# Patient Record
Sex: MALE | Race: WHITE | NOT HISPANIC OR LATINO | Employment: OTHER | URBAN - METROPOLITAN AREA
[De-identification: names, ages, dates, MRNs, and addresses within clinical notes are randomized per-mention and may not be internally consistent; named-entity substitution may affect disease eponyms.]

---

## 2019-09-10 ENCOUNTER — HOSPITAL ENCOUNTER (OUTPATIENT)
Facility: CLINIC | Age: 69
Discharge: HOME | End: 2019-09-10
Attending: COLON & RECTAL SURGERY | Admitting: COLON & RECTAL SURGERY
Payer: COMMERCIAL

## 2019-09-10 VITALS
DIASTOLIC BLOOD PRESSURE: 88 MMHG | SYSTOLIC BLOOD PRESSURE: 120 MMHG | HEART RATE: 62 BPM | RESPIRATION RATE: 39 BRPM | OXYGEN SATURATION: 96 %

## 2019-09-10 LAB — COLONOSCOPY: NORMAL

## 2019-09-10 PROCEDURE — 45385 COLONOSCOPY W/LESION REMOVAL: CPT | Performed by: COLON & RECTAL SURGERY

## 2019-09-10 PROCEDURE — G0500 MOD SEDAT ENDO SERVICE >5YRS: HCPCS | Performed by: COLON & RECTAL SURGERY

## 2019-09-10 PROCEDURE — 88305 TISSUE EXAM BY PATHOLOGIST: CPT | Performed by: COLON & RECTAL SURGERY

## 2019-09-10 PROCEDURE — 25000128 H RX IP 250 OP 636: Performed by: COLON & RECTAL SURGERY

## 2019-09-10 PROCEDURE — 88305 TISSUE EXAM BY PATHOLOGIST: CPT | Mod: 26 | Performed by: COLON & RECTAL SURGERY

## 2019-09-10 RX ORDER — NALOXONE HYDROCHLORIDE 0.4 MG/ML
.1-.4 INJECTION, SOLUTION INTRAMUSCULAR; INTRAVENOUS; SUBCUTANEOUS
Status: CANCELLED | OUTPATIENT
Start: 2019-09-10 | End: 2019-09-11

## 2019-09-10 RX ORDER — LIDOCAINE 40 MG/G
CREAM TOPICAL
Status: DISCONTINUED | OUTPATIENT
Start: 2019-09-10 | End: 2019-09-11 | Stop reason: HOSPADM

## 2019-09-10 RX ORDER — PROCHLORPERAZINE MALEATE 5 MG
5 TABLET ORAL EVERY 6 HOURS PRN
Status: CANCELLED | OUTPATIENT
Start: 2019-09-10

## 2019-09-10 RX ORDER — ONDANSETRON 4 MG/1
4 TABLET, ORALLY DISINTEGRATING ORAL EVERY 6 HOURS PRN
Status: CANCELLED | OUTPATIENT
Start: 2019-09-10

## 2019-09-10 RX ORDER — FLUMAZENIL 0.1 MG/ML
0.2 INJECTION, SOLUTION INTRAVENOUS
Status: CANCELLED | OUTPATIENT
Start: 2019-09-10 | End: 2019-09-10

## 2019-09-10 RX ORDER — ONDANSETRON 2 MG/ML
4 INJECTION INTRAMUSCULAR; INTRAVENOUS EVERY 6 HOURS PRN
Status: CANCELLED | OUTPATIENT
Start: 2019-09-10

## 2019-09-10 RX ORDER — FENTANYL CITRATE 50 UG/ML
INJECTION, SOLUTION INTRAMUSCULAR; INTRAVENOUS PRN
Status: DISCONTINUED | OUTPATIENT
Start: 2019-09-10 | End: 2019-09-11 | Stop reason: HOSPADM

## 2019-09-10 RX ORDER — ONDANSETRON 2 MG/ML
4 INJECTION INTRAMUSCULAR; INTRAVENOUS
Status: DISCONTINUED | OUTPATIENT
Start: 2019-09-10 | End: 2019-09-11 | Stop reason: HOSPADM

## 2019-09-10 NOTE — H&P
Pre-Endoscopy History and Physical   Soto Guevara MRN# [id number]   YOB: 1950 Age: 68 year old   Date of Procedure: 9/10/2019   Primary care provider: Carlos Merida   Type of Endoscopy: colonoscopy   Reason for Procedure: follow up diverticulitis   Type of Anesthesia Anticipated: Moderate Sedation   HPI:   Soto is a 68 year old male with a recent episode of diverticulitis.  He last had a colonoscopy in 2016 where 2 2-mm tubular adenomas were removed.  He denies BRBPR, abdominal pain, nausea/vomiting, changes in bowel habits or unintentional weight loss.  He denies a FH of CRC.    Allergies   Allergen Reactions     Amoxicillin      Biaxin [Clarithromycin]      Lomotil [Diphenoxylate]      Penicillins       Prior to Admission Medications   Prescriptions Last Dose Informant Patient Reported? Taking?   BuPROPion HCl (WELLBUTRIN PO) 9/9/2019 at Unknown time  Yes Yes   LOSARTAN POTASSIUM PO 9/9/2019 at Unknown time  Yes Yes   amLODIPine (NORVASC) 5 MG tablet 9/9/2019 at Unknown time  Yes Yes   Sig: Take 2.5 mg by mouth daily    lisinopril (PRINIVIL,ZESTRIL) 20 MG tablet Unknown at Unknown time  Yes No   Sig: Take 20 mg by mouth daily.   lisinopril (PRINIVIL,ZESTRIL) 20 MG tablet Unknown at Unknown time  No No   Sig: Take 1 tablet by mouth daily.   omeprazole 20 MG tablet 9/9/2019 at Unknown time  Yes Yes   Sig: Take 20 mg by mouth daily.      Facility-Administered Medications: None      Patient Active Problem List   Diagnosis     HTN (hypertension)     GERD (gastroesophageal reflux disease)     DJD (degenerative joint disease) of knee      Past Medical History:   Diagnosis Date     Acid reflux      Depressive disorder      Hyperlipidemia      Hypertension      Sleep apnea       Past Surgical History:   Procedure Laterality Date     BACK SURGERY       COLONOSCOPY N/A 4/22/2016    Procedure: COMBINED COLONOSCOPY, SINGLE OR MULTIPLE BIOPSY/POLYPECTOMY BY BIOPSY;  Surgeon: Unique Silveira,  "MD;  Location:  GI     HERNIA REPAIR       ORTHOPEDIC SURGERY      3 total knee replacment     ORTHOPEDIC SURGERY      2 shoulder surgery     ORTHOPEDIC SURGERY      wrist surgery      Social History     Tobacco Use     Smoking status: Former Smoker     Smokeless tobacco: Never Used   Substance Use Topics     Alcohol use: Yes     Comment: socially      Family History   Problem Relation Age of Onset     Stomach Cancer Maternal Grandmother      Bone Cancer Paternal Grandfather       PHYSICAL EXAM:   BP (!) 146/101   Pulse 60   SpO2 99%  Estimated body mass index is 28.29 kg/m  as calculated from the following:    Height as of 4/22/16: 1.791 m (5' 10.5\").    Weight as of 4/22/16: 90.7 kg (200 lb).   RESP: lungs clear to auscultation - no rales, rhonchi or wheezes   CV: regular rates and rhythm   ASA Class 2 - Mild systemic disease    Assessment: 67 y/o gentleman with a recent history of diverticulitis    Plan: Colonoscopy with moderate sedation.  Risks of the procedure were discussed including, but not limited to, bleeding, perforation and missed lesions.  Patient understands and is willing to proceed.    Monster Arambula MD ....................  9/10/2019   8:11 AM  Colon and Rectal Surgery Staff  [phone]    "

## 2019-09-11 LAB — COPATH REPORT: NORMAL

## 2019-10-08 ENCOUNTER — HOSPITAL ENCOUNTER (EMERGENCY)
Facility: CLINIC | Age: 69
Discharge: HOME | End: 2019-10-08
Attending: EMERGENCY MEDICINE | Admitting: EMERGENCY MEDICINE
Payer: COMMERCIAL

## 2019-10-08 VITALS
OXYGEN SATURATION: 98 % | TEMPERATURE: 97.6 F | BODY MASS INDEX: 27.2 KG/M2 | RESPIRATION RATE: 16 BRPM | HEIGHT: 70 IN | HEART RATE: 66 BPM | WEIGHT: 190 LBS | SYSTOLIC BLOOD PRESSURE: 159 MMHG | DIASTOLIC BLOOD PRESSURE: 98 MMHG

## 2019-10-08 DIAGNOSIS — R53.83 FATIGUE, UNSPECIFIED TYPE: ICD-10-CM

## 2019-10-08 DIAGNOSIS — R03.0 ELEVATED BLOOD PRESSURE READING: ICD-10-CM

## 2019-10-08 DIAGNOSIS — E87.1 HYPONATREMIA: ICD-10-CM

## 2019-10-08 PROBLEM — K57.20 PERFORATION OF SIGMOID COLON DUE TO DIVERTICULITIS: Status: ACTIVE | Noted: 2019-06-29

## 2019-10-08 LAB
ALBUMIN SERPL-MCNC: 4.3 G/DL (ref 3.4–5)
ALBUMIN UR-MCNC: NEGATIVE MG/DL
ALP SERPL-CCNC: 110 U/L (ref 40–150)
ALT SERPL W P-5'-P-CCNC: 21 U/L (ref 0–70)
ANION GAP SERPL CALCULATED.3IONS-SCNC: 4 MMOL/L (ref 3–14)
APPEARANCE UR: CLEAR
AST SERPL W P-5'-P-CCNC: 21 U/L (ref 0–45)
BASOPHILS # BLD AUTO: 0 10E9/L (ref 0–0.2)
BASOPHILS NFR BLD AUTO: 0.6 %
BILIRUB SERPL-MCNC: 0.7 MG/DL (ref 0.2–1.3)
BILIRUB UR QL STRIP: NEGATIVE
BUN SERPL-MCNC: 11 MG/DL (ref 7–30)
CALCIUM SERPL-MCNC: 8.6 MG/DL (ref 8.5–10.1)
CHLORIDE SERPL-SCNC: 98 MMOL/L (ref 94–109)
CO2 SERPL-SCNC: 27 MMOL/L (ref 20–32)
COLOR UR AUTO: ABNORMAL
CREAT SERPL-MCNC: 0.9 MG/DL (ref 0.66–1.25)
DIFFERENTIAL METHOD BLD: ABNORMAL
EOSINOPHIL # BLD AUTO: 0.4 10E9/L (ref 0–0.7)
EOSINOPHIL NFR BLD AUTO: 6.6 %
ERYTHROCYTE [DISTWIDTH] IN BLOOD BY AUTOMATED COUNT: 13.8 % (ref 10–15)
GFR SERPL CREATININE-BSD FRML MDRD: 86 ML/MIN/{1.73_M2}
GLUCOSE SERPL-MCNC: 100 MG/DL (ref 70–99)
GLUCOSE UR STRIP-MCNC: NEGATIVE MG/DL
HCT VFR BLD AUTO: 39.3 % (ref 40–53)
HGB BLD-MCNC: 13.4 G/DL (ref 13.3–17.7)
HGB UR QL STRIP: NEGATIVE
IMM GRANULOCYTES # BLD: 0 10E9/L (ref 0–0.4)
IMM GRANULOCYTES NFR BLD: 0.4 %
INTERPRETATION ECG - MUSE: NORMAL
KETONES UR STRIP-MCNC: NEGATIVE MG/DL
LEUKOCYTE ESTERASE UR QL STRIP: NEGATIVE
LYMPHOCYTES # BLD AUTO: 1.1 10E9/L (ref 0.8–5.3)
LYMPHOCYTES NFR BLD AUTO: 21.6 %
MAGNESIUM SERPL-MCNC: 2.1 MG/DL (ref 1.6–2.3)
MCH RBC QN AUTO: 28.5 PG (ref 26.5–33)
MCHC RBC AUTO-ENTMCNC: 34.1 G/DL (ref 31.5–36.5)
MCV RBC AUTO: 83 FL (ref 78–100)
MONOCYTES # BLD AUTO: 0.5 10E9/L (ref 0–1.3)
MONOCYTES NFR BLD AUTO: 10 %
NEUTROPHILS # BLD AUTO: 3.2 10E9/L (ref 1.6–8.3)
NEUTROPHILS NFR BLD AUTO: 60.8 %
NITRATE UR QL: NEGATIVE
NRBC # BLD AUTO: 0 10*3/UL
NRBC BLD AUTO-RTO: 0 /100
PH UR STRIP: 7 PH (ref 5–7)
PLATELET # BLD AUTO: 196 10E9/L (ref 150–450)
POTASSIUM SERPL-SCNC: 4.1 MMOL/L (ref 3.4–5.3)
PROT SERPL-MCNC: 7 G/DL (ref 6.8–8.8)
RBC # BLD AUTO: 4.71 10E12/L (ref 4.4–5.9)
SODIUM SERPL-SCNC: 129 MMOL/L (ref 133–144)
SOURCE: ABNORMAL
SP GR UR STRIP: 1 (ref 1–1.03)
TSH SERPL DL<=0.005 MIU/L-ACNC: 1.8 MU/L (ref 0.4–4)
UROBILINOGEN UR STRIP-MCNC: NORMAL MG/DL (ref 0–2)
WBC # BLD AUTO: 5.3 10E9/L (ref 4–11)

## 2019-10-08 PROCEDURE — 81003 URINALYSIS AUTO W/O SCOPE: CPT | Performed by: EMERGENCY MEDICINE

## 2019-10-08 PROCEDURE — 25800030 ZZH RX IP 258 OP 636: Performed by: EMERGENCY MEDICINE

## 2019-10-08 PROCEDURE — 83735 ASSAY OF MAGNESIUM: CPT | Performed by: EMERGENCY MEDICINE

## 2019-10-08 PROCEDURE — 99284 EMERGENCY DEPT VISIT MOD MDM: CPT | Mod: 25

## 2019-10-08 PROCEDURE — 85025 COMPLETE CBC W/AUTO DIFF WBC: CPT | Performed by: EMERGENCY MEDICINE

## 2019-10-08 PROCEDURE — 80053 COMPREHEN METABOLIC PANEL: CPT | Performed by: EMERGENCY MEDICINE

## 2019-10-08 PROCEDURE — 96360 HYDRATION IV INFUSION INIT: CPT

## 2019-10-08 PROCEDURE — 93005 ELECTROCARDIOGRAM TRACING: CPT

## 2019-10-08 PROCEDURE — 84443 ASSAY THYROID STIM HORMONE: CPT | Performed by: EMERGENCY MEDICINE

## 2019-10-08 RX ADMIN — SODIUM CHLORIDE, POTASSIUM CHLORIDE, SODIUM LACTATE AND CALCIUM CHLORIDE 1000 ML: 600; 310; 30; 20 INJECTION, SOLUTION INTRAVENOUS at 12:02

## 2019-10-08 ASSESSMENT — ENCOUNTER SYMPTOMS
APPETITE CHANGE: 1
FATIGUE: 1
BLOOD IN STOOL: 0
VOMITING: 0
UNEXPECTED WEIGHT CHANGE: 0
NAUSEA: 0
HEMATURIA: 0
ANAL BLEEDING: 0
COUGH: 0
ABDOMINAL PAIN: 0
ACTIVITY CHANGE: 1
DYSPHORIC MOOD: 0
WEAKNESS: 1
LIGHT-HEADEDNESS: 1
FEVER: 0
CHILLS: 1
BRUISES/BLEEDS EASILY: 0

## 2019-10-08 ASSESSMENT — MIFFLIN-ST. JEOR: SCORE: 1633.08

## 2019-10-08 NOTE — ED AVS SNAPSHOT
Emergency Department  [address] [place] [id number]  Phone:  [phone]  Fax:  [phone]                                    Soto Guevara   MRN: [id number]    Department:   Emergency Department   Date of Visit:  10/8/2019           After Visit Summary Signature Page    I have received my discharge instructions, and my questions have been answered. I have discussed any challenges I see with this plan with the nurse or doctor.    ..........................................................................................................................................  Patient/Patient Representative Signature      ..........................................................................................................................................  Patient Representative Print Name and Relationship to Patient    ..................................................               ................................................  Date                                   Time    ..........................................................................................................................................  Reviewed by Signature/Title    ...................................................              ..............................................  Date                                               Time          22EPIC Rev 08/18

## 2019-10-08 NOTE — ED PROVIDER NOTES
"  History     Chief Complaint:  Generalized Weakness and Shortness of Breath      HPI   Soto Guevara is a 68 year old male with a history of hypertension, sleep apnea, hyperlipidemia, and IBS who presents with generalized weakness and fatigue. Over the last one week he has been experiencing increasing fatigue and weakness, to the point that he is having to nap for 2 hours at a time. While walking, he feels like he has to \"tip over\" because of his generalized weakness. He has associated lightheadedness, chills, appetite decrease, and vision changes, described as \"tired eyes.\" He has never had fatigue like this before. Soto denies any recent rash, abdominal pain, nausea, emesis, diarrhea, leg swelling, dysphoric mood/depression, urinary symptoms, decreased urine, hematuria, hematochezia, epistaxis, abnormal bleeding, unexpected weight changes, cough, congestion, shortness of breath, and fever.  Soto denies any personal history of thyroid disease.    Allergies:  Amoxicillin  Biaxin [Clarithromycin]  Lomotil [Diphenoxylate]  Penicillins      Medications:    Amlodipine   Wellbutrin   Omeprazole   Losartan     Past Medical History:    Hypertension  Gastroesophageal reflux disease  Degenerative joint disease knee   Hyperlipidemia    Sleep apnea   Diverticulitis of colon with perforation   IBS  Depression     Past Surgical History:    Back surgery   Colonoscopy  Hernia repair  Total knee replacements x3   Shoulder surgery x2   Wrist surgery     Family History:    History reviewed. No pertinent family history.      Social History:  The patient was alone.  Smoking Status: Former smoker   Smokeless Tobacco: Never  Alcohol Use: Yes   Marital Status:  Single      Review of Systems   Constitutional: Positive for activity change, appetite change, chills and fatigue. Negative for fever and unexpected weight change.   HENT: Negative for congestion and nosebleeds.    Eyes: Positive for visual disturbance.   Respiratory: Negative " "for cough.    Cardiovascular: Negative for leg swelling.   Gastrointestinal: Negative for abdominal pain, anal bleeding, blood in stool, nausea and vomiting.   Genitourinary: Negative.  Negative for decreased urine volume and hematuria.   Skin: Negative for rash.   Neurological: Positive for weakness and light-headedness.   Hematological: Does not bruise/bleed easily.   Psychiatric/Behavioral: Negative for dysphoric mood.   All other systems reviewed and are negative.      Physical Exam     Patient Vitals for the past 24 hrs:   BP Temp Temp src Heart Rate Resp SpO2 Height Weight   10/08/19 1115 (!) 184/99 97.6  F (36.4  C) Oral 64 16 98 % 1.778 m (5' 10\") 86.2 kg (190 lb)      Physical Exam  General: Well appearing, nontoxic. Resting comfortably  Head:  Scalp, face, and head appear normal  Eyes:  Pupils are equal, round, and reactive to light    Conjunctivae non-injected and sclerae white  ENT:    The external nose is normal    Pinnae are normal    The oropharynx is normal, mucous membranes moist    Posterior pharynx clear without swelling, exudates or erythema    Uvula is in the midline  Neck:  Normal range of motion    There is no rigidity noted    Trachea is in the midline  CV:  Regular rate and rhythm     Normal S1/S2, no S3/S4    No murmur or rub. Radial pulses 2+ bilaterally   Resp:  Lungs are clear and equal bilaterally    There is no tachypnea    No increased work of breathing    No rales, wheezing, or rhonchi  GI:  Abdomen is soft, no rigidity or guarding    No distension, or mass    No tenderness or rebound tenderness   MS:  Normal muscular tone    Symmetric motor strength    No lower extremity edema  Skin:  No rash or acute skin lesions noted  Neuro: A&Ox3, GCS 15    CN II - XII intact    Speech clear, fluent, and normal    Strength 5/5 and symmetric in bilateral upper and lower extremities.    No tremor.     No meningismus   Psych:  Normal affect.  Appropriate interactions.      Emergency Department " Course     ECG:  Indication: Generalized weakness, shortness of breath   Completed at 1215.  Read at 1228.   Normal sinus rhythm  Normal ECG  Rate 66 bpm. KS interval 182. QRS duration 96. QT/QTc 426/446. P-R-T axes 74 43 87.    Laboratory:  Laboratory findings were communicated with the patient who voiced understanding of the findings.    CBC: AWNL. (WBC 5.3, HGB 13.4, )   CMP: Sodium 129 (L), Glucose 100 (H) o/w WNL (Creatinine 0.90)    TSH with free T4 reflex: 1.80   Magnesium: 2.1    UA: Light yellow, clear urine. Specific gravity urine 1.002 (H) o/w WNL      Procedures:  None     Interventions:  1202 - LR bolus 1000mL IV        Emergency Department Course:  Past medical records, nursing notes, and vitals reviewed.    1138: I performed an exam of the patient as documented above.     EKG obtained in the ED, see results above.   IV was inserted and blood was drawn for laboratory testing, results above.  The patient provided a urine sample here in the emergency department. This was sent for laboratory testing, findings above.    1310: Patient rechecked and updated.      Findings and plan explained to the Patient. Patient discharged home with instructions regarding supportive care, medications, and reasons to return. The importance of close follow-up was reviewed.    I personally reviewed the laboratory and imaging results with the Patient and answered all related questions prior to discharge.      Impression & Plan     Medical Decision Making:  Soto Guevara is a 68 year old male who presents to the emergency department today with worsening fatigue. On my evaluation, he is well appearing, afebrile, and hemodynamically stable. He has no focal neurologic deficits. Differential diagnosis considered, however his symptoms are fairly nonspecific. He has no headaches, head injuries, or other symptoms to suggest a primary CNS pathology. Neurologic exam is normal. He has no other infectious signs or symptoms.  Underlying metabolic disturbance would be possible, anemia, Atrial Fibrillation, or other dysrhythmia is also considered. Signs and symptoms are not consistent with acute coronary syndrome. Urinalysis is negative for infection. Patient has no respiratory signs or symptoms to suggest pneumonia or other primary pulmonary infection. He has no rash. TSH is normal without evidence for hypotyroidism. Work up in the emergency department revealed mild hyponatremia. This may be related to his symptoms. IV fluids were provided. At this time, there is no indication for hospitalization. The remainder of his ED work up is otherwise unremarkable. At this time, I have recommended close follow up with PCP if symptoms continue, as well as for recheck of his sodium. Patient was agreeable to plan of care and discharged in stable condition.     Discharge Diagnosis:    ICD-10-CM    1. Fatigue, unspecified type R53.83    2. Hyponatremia E87.1        Disposition:  Discharged to home.     Scribe Disclosure:  Angelia SCHNEIDER, am serving as a scribe at 11:21 AM on 10/8/2019 to document services personally performed by Brian Madrid MD based on my observations and the provider's statements to me.     Angelia Riggs  10/8/2019    EMERGENCY DEPARTMENT       Brian Madrid MD  10/08/19 0384

## 2023-10-13 RX ORDER — CETIRIZINE HYDROCHLORIDE 10 MG/1
1 TABLET ORAL DAILY
COMMUNITY

## 2023-10-13 RX ORDER — AMOXICILLIN 500 MG
1 CAPSULE ORAL DAILY
COMMUNITY

## 2023-10-13 RX ORDER — BUPROPION HYDROCHLORIDE 100 MG/1
1 TABLET, EXTENDED RELEASE ORAL EVERY MORNING
COMMUNITY

## 2023-10-13 RX ORDER — LACTOBACILLUS RHAMNOSUS GG 10B CELL
1 CAPSULE ORAL DAILY
COMMUNITY

## 2023-10-13 RX ORDER — OLOPATADINE HYDROCHLORIDE 1 MG/ML
1 SOLUTION/ DROPS OPHTHALMIC 2 TIMES DAILY
Status: ON HOLD | COMMUNITY
End: 2023-10-17

## 2023-10-16 ENCOUNTER — ANESTHESIA EVENT (OUTPATIENT)
Dept: SURGERY | Facility: CLINIC | Age: 73
DRG: 164 | End: 2023-10-16
Payer: COMMERCIAL

## 2023-10-16 RX ORDER — SENNOSIDES 8.6 MG
0.5 CAPSULE ORAL EVERY 8 HOURS PRN
COMMUNITY

## 2023-10-16 NOTE — PROGRESS NOTES
PTA medications updated by Medication Scribe prior to surgery via phone call with patient (last doses completed by Nurse)     Medication history sources: Patient and H&P  In the past week, patient estimated taking medication this percent of the time: Greater than 90%      Significant changes made to the medication list:  Patient reports no longer taking the following meds (med scribe removed from PTA med list): Lisinopril      Additional medication history information:   Patient was advised to bring: Patanol OP    Medication reconciliation completed by provider prior to medication history? No    Time spent in this activity: 20 minutes    The information provided in this note is only as accurate as the sources available at the time of update(s)    Prior to Admission medications    Medication Sig Last Dose Taking? Auth Provider Long Term End Date   acetaminophen (TYLENOL 8 HOUR ARTHRITIS PAIN) 650 MG CR tablet Take 0.5 tablets by mouth every 8 hours as needed for mild pain or fever (0.5 x 650 mg = 325 mg) Unknown at prn Yes Reported, Patient     amLODIPine (NORVASC) 2.5 MG tablet Take 1 tablet by mouth daily  at am Yes Reported, Patient     buPROPion (WELLBUTRIN SR) 100 MG 12 hr tablet Take 1 tablet by mouth every morning 10/16/2023 at am Yes Reported, Patient No    cetirizine (ZYRTEC) 10 MG tablet Take 1 tablet by mouth daily 10/16/2023 at am Yes Reported, Patient     lactobacillus rhamnosus, GG, (CULTURELL) capsule Take 1 capsule by mouth daily 10/16/2023 at am Yes Reported, Patient     MULTIPLE VITAMIN-FOLIC ACID PO Take 1 capsule by mouth daily 10/16/2023 at am Yes Reported, Patient     olopatadine (PATANOL) 0.1 % ophthalmic solution Place 1 drop into both eyes 2 times daily 10/16/2023 at pm Yes Reported, Patient     Omega-3 Fatty Acids (FISH OIL) 1200 MG capsule Take 1 capsule by mouth daily 10/16/2023 at am Yes Reported, Patient     omeprazole (PRILOSEC) 20 MG DR capsule Take 1 capsule by mouth daily 10/16/2023  at am Yes Reported, Patient       Medication history completed by:    Mundo Rodriguez CPhT  Medication [facility]

## 2023-10-17 ENCOUNTER — ANESTHESIA (OUTPATIENT)
Dept: SURGERY | Facility: CLINIC | Age: 73
DRG: 164 | End: 2023-10-17
Payer: COMMERCIAL

## 2023-10-17 ENCOUNTER — APPOINTMENT (OUTPATIENT)
Dept: GENERAL RADIOLOGY | Facility: CLINIC | Age: 73
DRG: 164 | End: 2023-10-17
Attending: THORACIC SURGERY (CARDIOTHORACIC VASCULAR SURGERY)
Payer: COMMERCIAL

## 2023-10-17 ENCOUNTER — HOSPITAL ENCOUNTER (INPATIENT)
Facility: CLINIC | Age: 73
LOS: 2 days | Discharge: HOME | DRG: 164 | End: 2023-10-19
Attending: THORACIC SURGERY (CARDIOTHORACIC VASCULAR SURGERY) | Admitting: THORACIC SURGERY (CARDIOTHORACIC VASCULAR SURGERY)
Payer: COMMERCIAL

## 2023-10-17 ENCOUNTER — APPOINTMENT (OUTPATIENT)
Dept: CT IMAGING | Facility: CLINIC | Age: 73
DRG: 164 | End: 2023-10-17
Attending: THORACIC SURGERY (CARDIOTHORACIC VASCULAR SURGERY)
Payer: COMMERCIAL

## 2023-10-17 DIAGNOSIS — G89.18 ACUTE POST-OPERATIVE PAIN: Primary | ICD-10-CM

## 2023-10-17 DIAGNOSIS — R91.8 PULMONARY NODULES: ICD-10-CM

## 2023-10-17 PROBLEM — R91.1 NODULE OF UPPER LOBE OF LEFT LUNG: Status: ACTIVE | Noted: 2023-10-17

## 2023-10-17 LAB
ABO/RH(D): NORMAL
ANION GAP SERPL CALCULATED.3IONS-SCNC: 11 MMOL/L (ref 7–15)
ANTIBODY SCREEN: NEGATIVE
BUN SERPL-MCNC: 12.5 MG/DL (ref 8–23)
CALCIUM SERPL-MCNC: 9.2 MG/DL (ref 8.8–10.2)
CHLORIDE SERPL-SCNC: 98 MMOL/L (ref 98–107)
CREAT SERPL-MCNC: 1.02 MG/DL (ref 0.67–1.17)
DEPRECATED HCO3 PLAS-SCNC: 25 MMOL/L (ref 22–29)
EGFRCR SERPLBLD CKD-EPI 2021: 78 ML/MIN/1.73M2
ERYTHROCYTE [DISTWIDTH] IN BLOOD BY AUTOMATED COUNT: 13.7 % (ref 10–15)
GLUCOSE BLDC GLUCOMTR-MCNC: 150 MG/DL (ref 70–99)
GLUCOSE SERPL-MCNC: 125 MG/DL (ref 70–99)
HCT VFR BLD AUTO: 43.9 % (ref 40–53)
HGB BLD-MCNC: 15 G/DL (ref 13.3–17.7)
INR PPP: 0.9 (ref 0.85–1.15)
MCH RBC QN AUTO: 30.9 PG (ref 26.5–33)
MCHC RBC AUTO-ENTMCNC: 34.2 G/DL (ref 31.5–36.5)
MCV RBC AUTO: 91 FL (ref 78–100)
PLATELET # BLD AUTO: 199 10E3/UL (ref 150–450)
POTASSIUM SERPL-SCNC: 4.3 MMOL/L (ref 3.4–5.3)
RBC # BLD AUTO: 4.85 10E6/UL (ref 4.4–5.9)
SODIUM SERPL-SCNC: 134 MMOL/L (ref 135–145)
SPECIMEN EXPIRATION DATE: NORMAL
WBC # BLD AUTO: 4.9 10E3/UL (ref 4–11)

## 2023-10-17 PROCEDURE — 271N000002 HC RX 271: Performed by: PHYSICIAN ASSISTANT

## 2023-10-17 PROCEDURE — 88305 TISSUE EXAM BY PATHOLOGIST: CPT | Mod: 26 | Performed by: PATHOLOGY

## 2023-10-17 PROCEDURE — 80048 BASIC METABOLIC PNL TOTAL CA: CPT | Performed by: THORACIC SURGERY (CARDIOTHORACIC VASCULAR SURGERY)

## 2023-10-17 PROCEDURE — 250N000011 HC RX IP 250 OP 636: Performed by: THORACIC SURGERY (CARDIOTHORACIC VASCULAR SURGERY)

## 2023-10-17 PROCEDURE — 07B70ZX EXCISION OF THORAX LYMPHATIC, OPEN APPROACH, DIAGNOSTIC: ICD-10-PCS | Performed by: THORACIC SURGERY (CARDIOTHORACIC VASCULAR SURGERY)

## 2023-10-17 PROCEDURE — 370N000017 HC ANESTHESIA TECHNICAL FEE, PER MIN: Performed by: THORACIC SURGERY (CARDIOTHORACIC VASCULAR SURGERY)

## 2023-10-17 PROCEDURE — 258N000003 HC RX IP 258 OP 636: Performed by: ANESTHESIOLOGY

## 2023-10-17 PROCEDURE — 88307 TISSUE EXAM BY PATHOLOGIST: CPT | Mod: TC | Performed by: THORACIC SURGERY (CARDIOTHORACIC VASCULAR SURGERY)

## 2023-10-17 PROCEDURE — 88307 TISSUE EXAM BY PATHOLOGIST: CPT | Mod: 26 | Performed by: PATHOLOGY

## 2023-10-17 PROCEDURE — 88331 PATH CONSLTJ SURG 1 BLK 1SPC: CPT | Mod: 26 | Performed by: PATHOLOGY

## 2023-10-17 PROCEDURE — 258N000003 HC RX IP 258 OP 636: Performed by: PHYSICIAN ASSISTANT

## 2023-10-17 PROCEDURE — 272N000001 HC OR GENERAL SUPPLY STERILE: Performed by: THORACIC SURGERY (CARDIOTHORACIC VASCULAR SURGERY)

## 2023-10-17 PROCEDURE — 250N000011 HC RX IP 250 OP 636: Performed by: ANESTHESIOLOGY

## 2023-10-17 PROCEDURE — 88344 IMHCHEM/IMCYTCHM EA MLT ANTB: CPT | Mod: 26 | Performed by: PATHOLOGY

## 2023-10-17 PROCEDURE — 999N000141 HC STATISTIC PRE-PROCEDURE NURSING ASSESSMENT: Performed by: THORACIC SURGERY (CARDIOTHORACIC VASCULAR SURGERY)

## 2023-10-17 PROCEDURE — 710N000009 HC RECOVERY PHASE 1, LEVEL 1, PER MIN: Performed by: THORACIC SURGERY (CARDIOTHORACIC VASCULAR SURGERY)

## 2023-10-17 PROCEDURE — 250N000025 HC SEVOFLURANE, PER MIN: Performed by: THORACIC SURGERY (CARDIOTHORACIC VASCULAR SURGERY)

## 2023-10-17 PROCEDURE — 71250 CT THORAX DX C-: CPT

## 2023-10-17 PROCEDURE — 250N000009 HC RX 250: Performed by: ANESTHESIOLOGY

## 2023-10-17 PROCEDURE — 999N000063 XR CHEST PORT 1 VIEW

## 2023-10-17 PROCEDURE — 88342 IMHCHEM/IMCYTCHM 1ST ANTB: CPT | Mod: 26 | Performed by: PATHOLOGY

## 2023-10-17 PROCEDURE — 88331 PATH CONSLTJ SURG 1 BLK 1SPC: CPT | Mod: TC | Performed by: THORACIC SURGERY (CARDIOTHORACIC VASCULAR SURGERY)

## 2023-10-17 PROCEDURE — 120N000001 HC R&B MED SURG/OB

## 2023-10-17 PROCEDURE — 36415 COLL VENOUS BLD VENIPUNCTURE: CPT | Performed by: THORACIC SURGERY (CARDIOTHORACIC VASCULAR SURGERY)

## 2023-10-17 PROCEDURE — 250N000013 HC RX MED GY IP 250 OP 250 PS 637: Performed by: PHYSICIAN ASSISTANT

## 2023-10-17 PROCEDURE — 250N000011 HC RX IP 250 OP 636: Mod: JZ | Performed by: ANESTHESIOLOGY

## 2023-10-17 PROCEDURE — 85027 COMPLETE CBC AUTOMATED: CPT | Performed by: THORACIC SURGERY (CARDIOTHORACIC VASCULAR SURGERY)

## 2023-10-17 PROCEDURE — 0BBG0ZZ EXCISION OF LEFT UPPER LUNG LOBE, OPEN APPROACH: ICD-10-PCS | Performed by: THORACIC SURGERY (CARDIOTHORACIC VASCULAR SURGERY)

## 2023-10-17 PROCEDURE — 250N000009 HC RX 250: Performed by: THORACIC SURGERY (CARDIOTHORACIC VASCULAR SURGERY)

## 2023-10-17 PROCEDURE — 88341 IMHCHEM/IMCYTCHM EA ADD ANTB: CPT | Mod: 26 | Performed by: PATHOLOGY

## 2023-10-17 PROCEDURE — 250N000011 HC RX IP 250 OP 636: Performed by: PHYSICIAN ASSISTANT

## 2023-10-17 PROCEDURE — 360N000077 HC SURGERY LEVEL 4, PER MIN: Performed by: THORACIC SURGERY (CARDIOTHORACIC VASCULAR SURGERY)

## 2023-10-17 PROCEDURE — 86901 BLOOD TYPING SEROLOGIC RH(D): CPT | Performed by: THORACIC SURGERY (CARDIOTHORACIC VASCULAR SURGERY)

## 2023-10-17 PROCEDURE — 85610 PROTHROMBIN TIME: CPT | Performed by: THORACIC SURGERY (CARDIOTHORACIC VASCULAR SURGERY)

## 2023-10-17 RX ORDER — FENTANYL CITRATE 50 UG/ML
25 INJECTION, SOLUTION INTRAMUSCULAR; INTRAVENOUS EVERY 5 MIN PRN
Status: DISCONTINUED | OUTPATIENT
Start: 2023-10-17 | End: 2023-10-17

## 2023-10-17 RX ORDER — DIPHENHYDRAMINE HCL 12.5MG/5ML
12.5 LIQUID (ML) ORAL EVERY 6 HOURS PRN
Status: DISCONTINUED | OUTPATIENT
Start: 2023-10-17 | End: 2023-10-19 | Stop reason: HOSPADM

## 2023-10-17 RX ORDER — ACETAMINOPHEN 325 MG/1
650 TABLET ORAL EVERY 4 HOURS PRN
Status: DISCONTINUED | OUTPATIENT
Start: 2023-10-20 | End: 2023-10-19 | Stop reason: HOSPADM

## 2023-10-17 RX ORDER — SOFT LENS ADJUNCTIVE SOLUTIONS
1 DROPS OPHTHALMIC (EYE) 2 TIMES DAILY
COMMUNITY

## 2023-10-17 RX ORDER — CEFAZOLIN SODIUM/WATER 2 G/20 ML
2 SYRINGE (ML) INTRAVENOUS SEE ADMIN INSTRUCTIONS
Status: DISCONTINUED | OUTPATIENT
Start: 2023-10-17 | End: 2023-10-17 | Stop reason: HOSPADM

## 2023-10-17 RX ORDER — NALOXONE HYDROCHLORIDE 0.4 MG/ML
0.4 INJECTION, SOLUTION INTRAMUSCULAR; INTRAVENOUS; SUBCUTANEOUS
Status: DISCONTINUED | OUTPATIENT
Start: 2023-10-17 | End: 2023-10-19 | Stop reason: HOSPADM

## 2023-10-17 RX ORDER — OLOPATADINE HYDROCHLORIDE 2 MG/ML
1 SOLUTION/ DROPS OPHTHALMIC DAILY
COMMUNITY

## 2023-10-17 RX ORDER — DIPHENHYDRAMINE HYDROCHLORIDE 50 MG/ML
12.5 INJECTION INTRAMUSCULAR; INTRAVENOUS EVERY 6 HOURS PRN
Status: DISCONTINUED | OUTPATIENT
Start: 2023-10-17 | End: 2023-10-19 | Stop reason: HOSPADM

## 2023-10-17 RX ORDER — PANTOPRAZOLE SODIUM 40 MG/1
40 TABLET, DELAYED RELEASE ORAL DAILY
Status: DISCONTINUED | OUTPATIENT
Start: 2023-10-18 | End: 2023-10-19 | Stop reason: HOSPADM

## 2023-10-17 RX ORDER — FENTANYL CITRATE 50 UG/ML
50 INJECTION, SOLUTION INTRAMUSCULAR; INTRAVENOUS EVERY 5 MIN PRN
Status: DISCONTINUED | OUTPATIENT
Start: 2023-10-17 | End: 2023-10-17

## 2023-10-17 RX ORDER — NALOXONE HYDROCHLORIDE 0.4 MG/ML
0.2 INJECTION, SOLUTION INTRAMUSCULAR; INTRAVENOUS; SUBCUTANEOUS
Status: DISCONTINUED | OUTPATIENT
Start: 2023-10-17 | End: 2023-10-19 | Stop reason: HOSPADM

## 2023-10-17 RX ORDER — HYDROMORPHONE HYDROCHLORIDE 2 MG/1
4 TABLET ORAL
Status: DISCONTINUED | OUTPATIENT
Start: 2023-10-17 | End: 2023-10-19 | Stop reason: HOSPADM

## 2023-10-17 RX ORDER — ENOXAPARIN SODIUM 100 MG/ML
40 INJECTION SUBCUTANEOUS EVERY 24 HOURS
Status: DISCONTINUED | OUTPATIENT
Start: 2023-10-18 | End: 2023-10-19 | Stop reason: HOSPADM

## 2023-10-17 RX ORDER — KETOROLAC TROMETHAMINE 15 MG/ML
15 INJECTION, SOLUTION INTRAMUSCULAR; INTRAVENOUS EVERY 6 HOURS PRN
Status: COMPLETED | OUTPATIENT
Start: 2023-10-17 | End: 2023-10-18

## 2023-10-17 RX ORDER — HYDROMORPHONE HCL IN WATER/PF 6 MG/30 ML
0.2 PATIENT CONTROLLED ANALGESIA SYRINGE INTRAVENOUS EVERY 5 MIN PRN
Status: DISCONTINUED | OUTPATIENT
Start: 2023-10-17 | End: 2023-10-17

## 2023-10-17 RX ORDER — BUPIVACAINE HYDROCHLORIDE 5 MG/ML
INJECTION, SOLUTION PERINEURAL PRN
Status: DISCONTINUED | OUTPATIENT
Start: 2023-10-17 | End: 2023-10-17 | Stop reason: HOSPADM

## 2023-10-17 RX ORDER — SODIUM CHLORIDE 9 MG/ML
INJECTION, SOLUTION INTRAVENOUS CONTINUOUS
Status: DISCONTINUED | OUTPATIENT
Start: 2023-10-17 | End: 2023-10-19 | Stop reason: HOSPADM

## 2023-10-17 RX ORDER — ACETAMINOPHEN 325 MG/1
975 TABLET ORAL EVERY 8 HOURS
Qty: 27 TABLET | Refills: 0 | Status: DISCONTINUED | OUTPATIENT
Start: 2023-10-17 | End: 2023-10-19 | Stop reason: HOSPADM

## 2023-10-17 RX ORDER — AMLODIPINE BESYLATE 2.5 MG/1
2.5 TABLET ORAL DAILY
Status: DISCONTINUED | OUTPATIENT
Start: 2023-10-18 | End: 2023-10-19 | Stop reason: HOSPADM

## 2023-10-17 RX ORDER — BUPROPION HYDROCHLORIDE 100 MG/1
100 TABLET, EXTENDED RELEASE ORAL EVERY MORNING
Status: DISCONTINUED | OUTPATIENT
Start: 2023-10-18 | End: 2023-10-19 | Stop reason: HOSPADM

## 2023-10-17 RX ORDER — MAGNESIUM HYDROXIDE 1200 MG/15ML
LIQUID ORAL PRN
Status: DISCONTINUED | OUTPATIENT
Start: 2023-10-17 | End: 2023-10-17 | Stop reason: HOSPADM

## 2023-10-17 RX ORDER — LIDOCAINE 40 MG/G
CREAM TOPICAL
Status: DISCONTINUED | OUTPATIENT
Start: 2023-10-17 | End: 2023-10-19 | Stop reason: HOSPADM

## 2023-10-17 RX ORDER — HYDROMORPHONE HYDROCHLORIDE 2 MG/1
2 TABLET ORAL
Status: DISCONTINUED | OUTPATIENT
Start: 2023-10-17 | End: 2023-10-19 | Stop reason: HOSPADM

## 2023-10-17 RX ORDER — HYDROMORPHONE HCL IN WATER/PF 6 MG/30 ML
0.4 PATIENT CONTROLLED ANALGESIA SYRINGE INTRAVENOUS
Status: DISCONTINUED | OUTPATIENT
Start: 2023-10-17 | End: 2023-10-19 | Stop reason: HOSPADM

## 2023-10-17 RX ORDER — CEFAZOLIN SODIUM/WATER 2 G/20 ML
2 SYRINGE (ML) INTRAVENOUS
Status: DISCONTINUED | OUTPATIENT
Start: 2023-10-17 | End: 2023-10-17 | Stop reason: HOSPADM

## 2023-10-17 RX ORDER — CALCIUM CARBONATE 500 MG/1
500 TABLET, CHEWABLE ORAL 4 TIMES DAILY PRN
Status: DISCONTINUED | OUTPATIENT
Start: 2023-10-17 | End: 2023-10-19 | Stop reason: HOSPADM

## 2023-10-17 RX ORDER — DEXAMETHASONE SODIUM PHOSPHATE 4 MG/ML
INJECTION, SOLUTION INTRA-ARTICULAR; INTRALESIONAL; INTRAMUSCULAR; INTRAVENOUS; SOFT TISSUE PRN
Status: DISCONTINUED | OUTPATIENT
Start: 2023-10-17 | End: 2023-10-17

## 2023-10-17 RX ORDER — LABETALOL HYDROCHLORIDE 5 MG/ML
10 INJECTION, SOLUTION INTRAVENOUS
Status: DISCONTINUED | OUTPATIENT
Start: 2023-10-17 | End: 2023-10-17

## 2023-10-17 RX ORDER — ONDANSETRON 2 MG/ML
4 INJECTION INTRAMUSCULAR; INTRAVENOUS EVERY 30 MIN PRN
Status: DISCONTINUED | OUTPATIENT
Start: 2023-10-17 | End: 2023-10-17

## 2023-10-17 RX ORDER — PROPOFOL 10 MG/ML
INJECTION, EMULSION INTRAVENOUS CONTINUOUS PRN
Status: DISCONTINUED | OUTPATIENT
Start: 2023-10-17 | End: 2023-10-17

## 2023-10-17 RX ORDER — OLOPATADINE HYDROCHLORIDE 1 MG/ML
1 SOLUTION/ DROPS OPHTHALMIC 2 TIMES DAILY
Status: DISCONTINUED | OUTPATIENT
Start: 2023-10-17 | End: 2023-10-17

## 2023-10-17 RX ORDER — ONDANSETRON 4 MG/1
4 TABLET, ORALLY DISINTEGRATING ORAL EVERY 30 MIN PRN
Status: DISCONTINUED | OUTPATIENT
Start: 2023-10-17 | End: 2023-10-17

## 2023-10-17 RX ORDER — AMOXICILLIN 250 MG
1 CAPSULE ORAL 2 TIMES DAILY
Status: DISCONTINUED | OUTPATIENT
Start: 2023-10-17 | End: 2023-10-19 | Stop reason: HOSPADM

## 2023-10-17 RX ORDER — SODIUM CHLORIDE, SODIUM LACTATE, POTASSIUM CHLORIDE, CALCIUM CHLORIDE 600; 310; 30; 20 MG/100ML; MG/100ML; MG/100ML; MG/100ML
INJECTION, SOLUTION INTRAVENOUS CONTINUOUS
Status: DISCONTINUED | OUTPATIENT
Start: 2023-10-17 | End: 2023-10-17 | Stop reason: HOSPADM

## 2023-10-17 RX ORDER — ONDANSETRON 2 MG/ML
INJECTION INTRAMUSCULAR; INTRAVENOUS PRN
Status: DISCONTINUED | OUTPATIENT
Start: 2023-10-17 | End: 2023-10-17

## 2023-10-17 RX ORDER — LIDOCAINE HYDROCHLORIDE 20 MG/ML
INJECTION, SOLUTION INFILTRATION; PERINEURAL PRN
Status: DISCONTINUED | OUTPATIENT
Start: 2023-10-17 | End: 2023-10-17

## 2023-10-17 RX ORDER — FENTANYL CITRATE 50 UG/ML
INJECTION, SOLUTION INTRAMUSCULAR; INTRAVENOUS PRN
Status: DISCONTINUED | OUTPATIENT
Start: 2023-10-17 | End: 2023-10-17

## 2023-10-17 RX ORDER — PROCHLORPERAZINE MALEATE 5 MG
5 TABLET ORAL EVERY 6 HOURS PRN
Status: DISCONTINUED | OUTPATIENT
Start: 2023-10-17 | End: 2023-10-19 | Stop reason: HOSPADM

## 2023-10-17 RX ORDER — ONDANSETRON 2 MG/ML
4 INJECTION INTRAMUSCULAR; INTRAVENOUS EVERY 6 HOURS PRN
Status: DISCONTINUED | OUTPATIENT
Start: 2023-10-17 | End: 2023-10-19 | Stop reason: HOSPADM

## 2023-10-17 RX ORDER — ONDANSETRON 4 MG/1
4 TABLET, ORALLY DISINTEGRATING ORAL EVERY 6 HOURS PRN
Status: DISCONTINUED | OUTPATIENT
Start: 2023-10-17 | End: 2023-10-19 | Stop reason: HOSPADM

## 2023-10-17 RX ORDER — HYDROMORPHONE HCL IN WATER/PF 6 MG/30 ML
0.2 PATIENT CONTROLLED ANALGESIA SYRINGE INTRAVENOUS
Status: DISCONTINUED | OUTPATIENT
Start: 2023-10-17 | End: 2023-10-19 | Stop reason: HOSPADM

## 2023-10-17 RX ORDER — SODIUM CHLORIDE, SODIUM LACTATE, POTASSIUM CHLORIDE, CALCIUM CHLORIDE 600; 310; 30; 20 MG/100ML; MG/100ML; MG/100ML; MG/100ML
INJECTION, SOLUTION INTRAVENOUS CONTINUOUS
Status: DISCONTINUED | OUTPATIENT
Start: 2023-10-17 | End: 2023-10-17

## 2023-10-17 RX ORDER — HYDROMORPHONE HCL IN WATER/PF 6 MG/30 ML
0.4 PATIENT CONTROLLED ANALGESIA SYRINGE INTRAVENOUS EVERY 5 MIN PRN
Status: DISCONTINUED | OUTPATIENT
Start: 2023-10-17 | End: 2023-10-17

## 2023-10-17 RX ORDER — POLYETHYLENE GLYCOL 3350 17 G/17G
17 POWDER, FOR SOLUTION ORAL DAILY
Status: DISCONTINUED | OUTPATIENT
Start: 2023-10-18 | End: 2023-10-19 | Stop reason: HOSPADM

## 2023-10-17 RX ORDER — NITROGLYCERIN 0.4 MG/1
0.4 TABLET SUBLINGUAL EVERY 5 MIN PRN
Status: DISCONTINUED | OUTPATIENT
Start: 2023-10-17 | End: 2023-10-19 | Stop reason: HOSPADM

## 2023-10-17 RX ORDER — GINSENG 100 MG
CAPSULE ORAL
Status: DISCONTINUED | OUTPATIENT
Start: 2023-10-17 | End: 2023-10-19 | Stop reason: HOSPADM

## 2023-10-17 RX ORDER — CETIRIZINE HYDROCHLORIDE 10 MG/1
10 TABLET ORAL DAILY
Status: DISCONTINUED | OUTPATIENT
Start: 2023-10-18 | End: 2023-10-19 | Stop reason: HOSPADM

## 2023-10-17 RX ORDER — PROPOFOL 10 MG/ML
INJECTION, EMULSION INTRAVENOUS PRN
Status: DISCONTINUED | OUTPATIENT
Start: 2023-10-17 | End: 2023-10-17

## 2023-10-17 RX ADMIN — ACETAMINOPHEN 975 MG: 325 TABLET, FILM COATED ORAL at 21:12

## 2023-10-17 RX ADMIN — PROPOFOL 200 MG: 10 INJECTION, EMULSION INTRAVENOUS at 08:09

## 2023-10-17 RX ADMIN — HYDROMORPHONE HYDROCHLORIDE 0.4 MG: 0.2 INJECTION, SOLUTION INTRAMUSCULAR; INTRAVENOUS; SUBCUTANEOUS at 10:27

## 2023-10-17 RX ADMIN — SENNOSIDES AND DOCUSATE SODIUM 1 TABLET: 50; 8.6 TABLET ORAL at 13:48

## 2023-10-17 RX ADMIN — ACETAMINOPHEN 975 MG: 325 TABLET, FILM COATED ORAL at 13:48

## 2023-10-17 RX ADMIN — Medication 2 G: at 08:05

## 2023-10-17 RX ADMIN — HYDROMORPHONE HYDROCHLORIDE 2 MG: 2 TABLET ORAL at 21:12

## 2023-10-17 RX ADMIN — SODIUM CHLORIDE: 9 INJECTION, SOLUTION INTRAVENOUS at 13:50

## 2023-10-17 RX ADMIN — ONDANSETRON 4 MG: 2 INJECTION INTRAMUSCULAR; INTRAVENOUS at 08:46

## 2023-10-17 RX ADMIN — KETOROLAC TROMETHAMINE 15 MG: 15 INJECTION, SOLUTION INTRAMUSCULAR; INTRAVENOUS at 10:45

## 2023-10-17 RX ADMIN — ROCURONIUM BROMIDE 50 MG: 50 INJECTION, SOLUTION INTRAVENOUS at 08:09

## 2023-10-17 RX ADMIN — DEXAMETHASONE SODIUM PHOSPHATE 4 MG: 4 INJECTION, SOLUTION INTRA-ARTICULAR; INTRALESIONAL; INTRAMUSCULAR; INTRAVENOUS; SOFT TISSUE at 08:26

## 2023-10-17 RX ADMIN — LIDOCAINE HYDROCHLORIDE 100 MG: 20 INJECTION, SOLUTION INFILTRATION; PERINEURAL at 08:09

## 2023-10-17 RX ADMIN — KETOROLAC TROMETHAMINE 15 MG: 15 INJECTION, SOLUTION INTRAMUSCULAR; INTRAVENOUS at 17:53

## 2023-10-17 RX ADMIN — MIDAZOLAM 1 MG: 1 INJECTION INTRAMUSCULAR; INTRAVENOUS at 08:05

## 2023-10-17 RX ADMIN — PROPOFOL 30 MCG/KG/MIN: 10 INJECTION, EMULSION INTRAVENOUS at 08:22

## 2023-10-17 RX ADMIN — SODIUM CHLORIDE, POTASSIUM CHLORIDE, SODIUM LACTATE AND CALCIUM CHLORIDE: 600; 310; 30; 20 INJECTION, SOLUTION INTRAVENOUS at 07:10

## 2023-10-17 RX ADMIN — FENTANYL CITRATE 50 MCG: 50 INJECTION INTRAMUSCULAR; INTRAVENOUS at 08:09

## 2023-10-17 RX ADMIN — FENTANYL CITRATE 50 MCG: 50 INJECTION, SOLUTION INTRAMUSCULAR; INTRAVENOUS at 09:54

## 2023-10-17 RX ADMIN — Medication 550 ML: at 09:49

## 2023-10-17 RX ADMIN — SENNOSIDES AND DOCUSATE SODIUM 1 TABLET: 50; 8.6 TABLET ORAL at 21:12

## 2023-10-17 RX ADMIN — SUGAMMADEX 200 MG: 100 INJECTION, SOLUTION INTRAVENOUS at 09:20

## 2023-10-17 RX ADMIN — FENTANYL CITRATE 50 MCG: 50 INJECTION, SOLUTION INTRAMUSCULAR; INTRAVENOUS at 10:16

## 2023-10-17 RX ADMIN — HYDROMORPHONE HYDROCHLORIDE 0.5 MG: 1 INJECTION, SOLUTION INTRAMUSCULAR; INTRAVENOUS; SUBCUTANEOUS at 08:39

## 2023-10-17 RX ADMIN — FENTANYL CITRATE 50 MCG: 50 INJECTION INTRAMUSCULAR; INTRAVENOUS at 08:28

## 2023-10-17 ASSESSMENT — ACTIVITIES OF DAILY LIVING (ADL)
ADLS_ACUITY_SCORE: 20
ADLS_ACUITY_SCORE: 18
ADLS_ACUITY_SCORE: 18
ADLS_ACUITY_SCORE: 20
ADLS_ACUITY_SCORE: 18

## 2023-10-17 ASSESSMENT — LIFESTYLE VARIABLES: TOBACCO_USE: 1

## 2023-10-17 NOTE — BRIEF OP NOTE
[facility]  Brief Operative Note    Pre-operative diagnosis: Nodule of upper lobe of left lung [R91.1]    Post-operative diagnosis: Left upper lobe adenocarcinoma    Procedure: Limited left thoracotomy, wedge resection of left upper lobe lung nodule, and mediastinal lymph node dissection.    Surgeon: Surgeon(s) and Role:     * Rafael James MD - Primary     * Dorothea Richards PA-C - Assisting    Anesthesia: General     Estimated Blood Loss: 10 mL from 10/17/2023  8:05 AM to 10/17/2023  9:32 AM    Drains: One 28 Fr straight chest tube to left lung apex    Specimens:   ID Type Source Tests Collected by Time Destination   1 : LEFT UPPER LOBE SECTION NODULE Tissue Lung, Left SURGICAL PATHOLOGY EXAM Rafael James MD 10/17/2023  8:45 AM    2 : SUBCARINAL LYMPH NODE #7 Tissue Lymph Node(s) SURGICAL PATHOLOGY EXAM Rafael James MD 10/17/2023  8:47 AM      Findings: Preliminary frozen section of left upper lobe lung nodule consistent with lepidic predominant adenocarcinoma. No pleural disease noted. Able to wedge nodule out without full lobectomy.    Complications: None    Implants: None    Joyce Richards PA-C with Dr. Rafael James  [facility]  Office: [phone]  Cell: [phone]

## 2023-10-17 NOTE — OR NURSING
Post op CXR completed in PACU    OnCue pump unclamped and taped to pt's abdomen.     Chest tube connection secured appropriately.

## 2023-10-17 NOTE — OP NOTE
PROCEDURE DATE: 10/17/2023    SURGEON: Rafael James MD    FIRST ASSISTANT:Joyce Richards PA-C     PREOPERATIVE DIAGNOSIS: Left upper lobe lung nodule suspicious for adenocarcinoma of the left upper lobe lung    POSTOPERATIVE DIAGNOSIS: Same    PROCEDURES:  Limited left thoracotomy with wedge resection of left upper lobe lung nodule and mediastinal lymph node dissection    ANESTHESIA: General with double-lumen endotracheal tube      INDICATIONS FOR PROCEDURE: 72-year-old gentleman with a small groundglass nodule towards the apex of the left upper lobe lung medially.  PET scan did not show evidence of ana or distant disease.  He underwent ion robotic bronchoscopy with biopsy.  The pathology showed at least atypical adenomatous hyperplasia.  Options of further diagnostic procedure and treatment reviewed.  It was elected to proceed with surgical resection.      DESCRIPTION OF PROCEDURE: The patient was brought to the operating placed in supine position.  Under general anesthesia with a double-lumen endotracheal tube, the patient was placed in the right lateral decubitus position.  The left chest was prepared and draped in fashion using ChloraPrep.  The ventilation of the left lung was discontinued.  A limited posterolateral thoracotomy was made sparing the serratus anterior muscle.  Pleural space was entered and the fifth costal space preserving the integrity of the ribs.    On exploration, there is no pleural fluid or pleural nodule.  Diaphragm hilum and mediastinum are normal.  Careful palpation of the entire lung was done.  The nodule was easily identified and was amenable at a wedge resection with excellent margins.  This was done with multiple applications of the [place] 60 gold stapling devices.  The staple line was hemostatic.  The hilum was dissected circumferentially.  The only lymph node identified was a subcarinal lymph node which was excised and submitted for permanent section.   No other lymph  node were identified during the mediastinal lymph node dissection.    Through a separate stab wound, a 28 straight chest was placed with the tip directed apex posteriorly and sutured skin #2 silk suture.  On-Q catheter were placed.  30 cc of Marcaine 0.5% without epinephrine was injected as intercostal blocks.  The incision was opened with pericostal suture Vicryl 1, running #1 Vicryl on the muscular layer, running 2-0 Vicryl subcutaneous tissue and the and the skin closed with INSORB staples.    ESTIMATED BLOOD LOSS: Minimal    COUNTS: Needle and sponge count is correct    Joyce Richards PA-C  was the first assistant during the procedure. Her role as first assistant during the procedure was essential and necessary to accomplishing the steps described in the procedure above, providing exposure, retraction and handling of the scope.     Rafael James MD

## 2023-10-17 NOTE — OR NURSING
Sign out received from [facility] for pt to transfer to inpatient unit.

## 2023-10-17 NOTE — ANESTHESIA PROCEDURE NOTES
Airway       Patient location during procedure: OR       Procedure Start/Stop Times: 10/17/2023 8:14 AM  Staff -        Anesthesiologist:  Stefano Ty DO       CRNA: Chance Montanez APRN CRNA       Performed By: CRNA  Consent for Airway        Urgency: elective  Indications and Patient Condition       Indications for airway management: telma-procedural       Induction type:intravenous       Mask difficulty assessment: 2 - vent by mask + OA or adjuvant +/- NMBA    Final Airway Details       Final airway type: endotracheal airway       Successful airway: ETT - double lumen left  Endotracheal Airway Details        Cuffed: yes       Successful intubation technique: video laryngoscopy       VL Blade Size: Glidescope 4       Grade View of Cords: 1       Adjucts: stylet       Position: Right       Measured from: lips       Bite block used: None       ETT Double lumen (fr): 37    Post intubation assessment        Placement verified by: capnometry, equal breath sounds and chest rise        Number of attempts at approach: 1       Number of other approaches attempted: 0       Secured with: silk tape       Ease of procedure: easy       Dentition: Intact and Unchanged    Medication(s) Administered   Medication Administration Time: 10/17/2023 8:14 AM    Additional Comments       ETT position verified after turning right lateral.

## 2023-10-17 NOTE — ANESTHESIA PREPROCEDURE EVALUATION
Anesthesia Pre-Procedure Evaluation    Patient: Soto Guevara   MRN: [id number] : 1950        Procedure : Procedure(s):  LEFT THORACOTOMY LEFT UPPER LOBECTOMY          Past Medical History:   Diagnosis Date    Acid reflux     Acute nonintractable headache     Arthralgia     BPH (benign prostatic hyperplasia)     Chest wall pain     Degenerative joint disease of knee     Depressive disorder     Diverticulosis of large intestine     Hyperlipidemia     Hypertension     Hypertensive urgency     Hyponatremia     Hypotestosteronism     Irritable bowel syndrome     Perforation of sigmoid colon due to diverticulitis     Seasonal affective disorder (H24)     Seasonal allergic rhinitis     Sleep apnea       Past Surgical History:   Procedure Laterality Date    BACK SURGERY      L4-L5 microdiscectomy    BRONCHOSCOPY      CATARACT EXTRACTION W/ INTRAOCULAR LENS IMPLANT Right     COLONOSCOPY N/A 2016    Procedure: COMBINED COLONOSCOPY, SINGLE OR MULTIPLE BIOPSY/POLYPECTOMY BY BIOPSY;  Surgeon: Unique Silveira MD;  Location:  GI    HERNIA REPAIR      ORTHOPEDIC SURGERY      3 total knee replacment    ORTHOPEDIC SURGERY      2 shoulder surgery    ORTHOPEDIC SURGERY      wrist surgery    SEPTOPLASTY        Allergies   Allergen Reactions    Amoxicillin     Biaxin [Clarithromycin]     Lomotil [Diphenoxylate]     Pcn [Penicillins]       Social History     Tobacco Use    Smoking status: Former    Smokeless tobacco: Never   Substance Use Topics    Alcohol use: Yes     Comment: socially      Wt Readings from Last 1 Encounters:   10/08/19 86.2 kg (190 lb)        Anesthesia Evaluation            ROS/MED HX  ENT/Pulmonary:     (+) sleep apnea, uses CPAP,              tobacco use, Past use,                      Neurologic:       Cardiovascular:     (+) Dyslipidemia hypertension- -   -  - -                                      METS/Exercise Tolerance:     Hematologic:       Musculoskeletal:   (+)  arthritis,            "  GI/Hepatic:     (+) GERD,                   Renal/Genitourinary:     (+)        BPH,      Endo:     (+)               Obesity,       Psychiatric/Substance Use:     (+) psychiatric history depression       Infectious Disease:       Malignancy:   (+) Malignancy, History of Lung.    Other:            Physical Exam    Airway        Mallampati: II   TM distance: > 3 FB   Neck ROM: full   Mouth opening: > 3 cm    Respiratory Devices and Support         Dental  no notable dental history   Comment: Upper dentures    (+) Removable bridges or other hardware      Cardiovascular   cardiovascular exam normal          Pulmonary   pulmonary exam normal        breath sounds clear to auscultation           OUTSIDE LABS:  CBC:   Lab Results   Component Value Date    WBC 5.3 10/08/2019    WBC 4.4 05/08/2007    HGB 13.4 10/08/2019    HGB 13.6 02/07/2008    HCT 39.3 (L) 10/08/2019    HCT 42.0 05/08/2007     10/08/2019     02/09/2008     BMP:   Lab Results   Component Value Date     (L) 10/08/2019     (L) 02/06/2008    POTASSIUM 4.1 10/08/2019    POTASSIUM 4.2 02/06/2008    CHLORIDE 98 10/08/2019    CHLORIDE 100 02/06/2008    CO2 27 10/08/2019    CO2 25 02/06/2008    BUN 11 10/08/2019    BUN 13 02/05/2008    CR 0.90 10/08/2019    CR 1.06 02/05/2008     (H) 10/08/2019    GLC 95 02/05/2008     COAGS:   Lab Results   Component Value Date    PTT 26 02/05/2008    INR 0.94 02/05/2008     POC: No results found for: \"BGM\", \"HCG\", \"HCGS\"  HEPATIC:   Lab Results   Component Value Date    ALBUMIN 4.3 10/08/2019    PROTTOTAL 7.0 10/08/2019    ALT 21 10/08/2019    AST 21 10/08/2019    ALKPHOS 110 10/08/2019    BILITOTAL 0.7 10/08/2019     OTHER:   Lab Results   Component Value Date    DARLIN 8.6 10/08/2019    MAG 2.1 10/08/2019    LIPASE 130 06/20/2005    TSH 1.80 10/08/2019       Anesthesia Plan    ASA Status:  3    NPO Status:  NPO Appropriate    Anesthesia Type: General.     - Airway: ETT   Induction: Intravenous. "   Maintenance: Balanced.   Techniques and Equipment:     - Airway: Double lumen ETT, Video-Laryngoscope, Fiberoptic Bronchoscope       Consents    Anesthesia Plan(s) and associated risks, benefits, and realistic alternatives discussed. Questions answered and patient/representative(s) expressed understanding.     - Discussed:     - Discussed with:  Patient      - Extended Intubation/Ventilatory Support Discussed: No.      - Patient is DNR/DNI Status: No     Use of blood products discussed: Yes.     - Discussed with: Patient.     - Consented: consented to blood products            Reason for refusal: other.     Postoperative Care    Pain management: IV analgesics, Oral pain medications, Multi-modal analgesia.   PONV prophylaxis: Ondansetron (or other 5HT-3), Dexamethasone or Solumedrol, Background Propofol Infusion     Comments:                Stefano Ty, DO

## 2023-10-17 NOTE — ANESTHESIA CARE TRANSFER NOTE
Patient: Soto Guevara    Procedure: Procedure(s):  LEFT THORACOTOMY LEFT UPPER LOBECTOMY, WEDGE RESECTION, LEFT UPPER LOBE LUNG NODULE, MEDIASTINAL LYMPH NODE RESECTION       Diagnosis: Nodule of upper lobe of left lung [R91.1]  Diagnosis Additional Information: No value filed.    Anesthesia Type:   General     Note:    Oropharynx: oropharynx clear of all foreign objects and spontaneously breathing  Level of Consciousness: drowsy  Oxygen Supplementation: face mask  Level of Supplemental Oxygen (L/min / FiO2): 6  Independent Airway: airway patency satisfactory and stable  Dentition: dentition unchanged  Vital Signs Stable: post-procedure vital signs reviewed and stable  Report to RN Given: handoff report given  Patient transferred to: PACU  Comments: Patient breathing spontaneously.  Follows commands.  Suctioned and extubated.  Exchanging air well.  Transferred to PACU with 6L O2 via mask.  Monitors on.  VSS.  Patent IV.  Report and transfer of care to RN.    Handoff Report: Identifed the Patient, Identified the Reponsible Provider, Reviewed the pertinent medical history, Discussed the surgical course, Reviewed Intra-OP anesthesia mangement and issues during anesthesia, Set expectations for post-procedure period and Allowed opportunity for questions and acknowledgement of understanding      Vitals:  Vitals Value Taken Time   /78 10/17/23 0934   Temp     Pulse 58 10/17/23 0939   Resp 9 10/17/23 0939   SpO2 100 % 10/17/23 0939   Vitals shown include unfiled device data.    Electronically Signed By: MAYKEL Baker CRNA  October 17, 2023  9:40 AM

## 2023-10-17 NOTE — INTERVAL H&P NOTE
"I have reviewed the surgical (or preoperative) H&P that is linked to this encounter, and examined the patient. There are no significant changes    Clinical Conditions Present on Arrival:  Clinically Significant Risk Factors Present on Admission         # Hyponatremia: Lowest Na = 134 mmol/L in last 30 days, will monitor as appropriate          # Obesity: Estimated body mass index is 30.95 kg/m  as calculated from the following:    Height as of this encounter: 1.778 m (5' 10\").    Weight as of this encounter: 97.8 kg (215 lb 11.2 oz).       "

## 2023-10-17 NOTE — ANESTHESIA POSTPROCEDURE EVALUATION
Patient: Soto Guevara    Procedure: Procedure(s):  LEFT THORACOTOMY LEFT UPPER LOBECTOMY, WEDGE RESECTION, LEFT UPPER LOBE LUNG NODULE, MEDIASTINAL LYMPH NODE RESECTION       Anesthesia Type:  General    Note:  Disposition: Inpatient   Postop Pain Control: Uneventful            Sign Out: Well controlled pain   PONV: No   Neuro/Psych: Uneventful            Sign Out: Acceptable/Baseline neuro status   Airway/Respiratory: Uneventful            Sign Out: Acceptable/Baseline resp. status   CV/Hemodynamics: Uneventful            Sign Out: Acceptable CV status; No obvious hypovolemia; No obvious fluid overload   Other NRE: NONE   DID A NON-ROUTINE EVENT OCCUR? No           Last vitals:  Vitals Value Taken Time   /85 10/17/23 1040   Temp 36.4  C (97.5  F) 10/17/23 1030   Pulse 56 10/17/23 1047   Resp 15 10/17/23 1047   SpO2 92 % 10/17/23 1047   Vitals shown include unfiled device data.    Electronically Signed By: Stefano Ty DO  October 17, 2023  10:48 AM

## 2023-10-17 NOTE — PLAN OF CARE
Pt arrived from PACU at 1152. VSS on RA. A/Ox4. Pain managed w/ rest and meds (see MAR). LS clear, denies SOB. IS at bedside, self-administered. Tele: SB, denies CP. CT to suction, no air leak or crepitus, dressing CDI. Drainage noted on L thora dressing, outlined. OnQ pump infusing, clamps open and sensors taped. Drainage noted from OnQ pump site. Up in chair for meal. Ambulated x2. Tolerating reg diet. PIV SL. Discharge tbd. Care plan updated.     Goal Outcome Evaluation:      Plan of Care Reviewed With: patient    Overall Patient Progress: improving    Problem: Lung Surgery  Goal: Effective Oxygenation and Ventilation  Outcome: Progressing  Intervention: Optimize Oxygenation and Ventilation

## 2023-10-18 ENCOUNTER — APPOINTMENT (OUTPATIENT)
Dept: GENERAL RADIOLOGY | Facility: CLINIC | Age: 73
DRG: 164 | End: 2023-10-18
Attending: PHYSICIAN ASSISTANT
Payer: COMMERCIAL

## 2023-10-18 LAB
ANION GAP SERPL CALCULATED.3IONS-SCNC: 9 MMOL/L (ref 7–15)
BUN SERPL-MCNC: 18.4 MG/DL (ref 8–23)
CALCIUM SERPL-MCNC: 9 MG/DL (ref 8.8–10.2)
CHLORIDE SERPL-SCNC: 97 MMOL/L (ref 98–107)
CREAT SERPL-MCNC: 1.07 MG/DL (ref 0.67–1.17)
DEPRECATED HCO3 PLAS-SCNC: 24 MMOL/L (ref 22–29)
EGFRCR SERPLBLD CKD-EPI 2021: 73 ML/MIN/1.73M2
GLUCOSE SERPL-MCNC: 108 MG/DL (ref 70–99)
HGB BLD-MCNC: 13.5 G/DL (ref 13.3–17.7)
POTASSIUM SERPL-SCNC: 4.6 MMOL/L (ref 3.4–5.3)
SODIUM SERPL-SCNC: 130 MMOL/L (ref 135–145)

## 2023-10-18 PROCEDURE — 120N000001 HC R&B MED SURG/OB

## 2023-10-18 PROCEDURE — 85018 HEMOGLOBIN: CPT | Performed by: PHYSICIAN ASSISTANT

## 2023-10-18 PROCEDURE — 80048 BASIC METABOLIC PNL TOTAL CA: CPT | Performed by: PHYSICIAN ASSISTANT

## 2023-10-18 PROCEDURE — 36415 COLL VENOUS BLD VENIPUNCTURE: CPT | Performed by: PHYSICIAN ASSISTANT

## 2023-10-18 PROCEDURE — 250N000011 HC RX IP 250 OP 636: Mod: JZ | Performed by: PHYSICIAN ASSISTANT

## 2023-10-18 PROCEDURE — 5A09357 ASSISTANCE WITH RESPIRATORY VENTILATION, LESS THAN 24 CONSECUTIVE HOURS, CONTINUOUS POSITIVE AIRWAY PRESSURE: ICD-10-PCS | Performed by: THORACIC SURGERY (CARDIOTHORACIC VASCULAR SURGERY)

## 2023-10-18 PROCEDURE — 71045 X-RAY EXAM CHEST 1 VIEW: CPT

## 2023-10-18 PROCEDURE — 250N000013 HC RX MED GY IP 250 OP 250 PS 637: Performed by: PHYSICIAN ASSISTANT

## 2023-10-18 RX ADMIN — AMLODIPINE BESYLATE 2.5 MG: 2.5 TABLET ORAL at 09:14

## 2023-10-18 RX ADMIN — ENOXAPARIN SODIUM 40 MG: 40 INJECTION SUBCUTANEOUS at 06:31

## 2023-10-18 RX ADMIN — BUPROPION HYDROCHLORIDE 100 MG: 100 TABLET, FILM COATED, EXTENDED RELEASE ORAL at 09:14

## 2023-10-18 RX ADMIN — HYDROMORPHONE HYDROCHLORIDE 0.4 MG: 0.2 INJECTION, SOLUTION INTRAMUSCULAR; INTRAVENOUS; SUBCUTANEOUS at 18:58

## 2023-10-18 RX ADMIN — SENNOSIDES AND DOCUSATE SODIUM 1 TABLET: 50; 8.6 TABLET ORAL at 20:57

## 2023-10-18 RX ADMIN — KETOROLAC TROMETHAMINE 15 MG: 15 INJECTION, SOLUTION INTRAMUSCULAR; INTRAVENOUS at 22:10

## 2023-10-18 RX ADMIN — CETIRIZINE HYDROCHLORIDE 10 MG: 10 TABLET, FILM COATED ORAL at 09:14

## 2023-10-18 RX ADMIN — HYDROMORPHONE HYDROCHLORIDE 4 MG: 2 TABLET ORAL at 01:29

## 2023-10-18 RX ADMIN — KETOROLAC TROMETHAMINE 15 MG: 15 INJECTION, SOLUTION INTRAMUSCULAR; INTRAVENOUS at 00:30

## 2023-10-18 RX ADMIN — ACETAMINOPHEN 975 MG: 325 TABLET, FILM COATED ORAL at 14:14

## 2023-10-18 RX ADMIN — ACETAMINOPHEN 975 MG: 325 TABLET, FILM COATED ORAL at 05:01

## 2023-10-18 RX ADMIN — HYDROMORPHONE HYDROCHLORIDE 4 MG: 2 TABLET ORAL at 05:01

## 2023-10-18 RX ADMIN — SENNOSIDES AND DOCUSATE SODIUM 1 TABLET: 50; 8.6 TABLET ORAL at 09:14

## 2023-10-18 RX ADMIN — ACETAMINOPHEN 975 MG: 325 TABLET, FILM COATED ORAL at 21:34

## 2023-10-18 RX ADMIN — HYDROMORPHONE HYDROCHLORIDE 4 MG: 2 TABLET ORAL at 09:14

## 2023-10-18 RX ADMIN — PANTOPRAZOLE SODIUM 40 MG: 40 TABLET, DELAYED RELEASE ORAL at 09:14

## 2023-10-18 RX ADMIN — POLYETHYLENE GLYCOL 3350 17 G: 17 POWDER, FOR SOLUTION ORAL at 09:17

## 2023-10-18 ASSESSMENT — ACTIVITIES OF DAILY LIVING (ADL)
ADLS_ACUITY_SCORE: 22
ADLS_ACUITY_SCORE: 20
ADLS_ACUITY_SCORE: 20
ADLS_ACUITY_SCORE: 22
ADLS_ACUITY_SCORE: 20
ADLS_ACUITY_SCORE: 22

## 2023-10-18 NOTE — PROGRESS NOTES
"THORACIC SURGERY POD#1    S: Doing well, pain well managed. Has been walking halls and using spirometer. Daughter is visiting.    O: BP (!) 155/92 (BP Location: Left arm)   Pulse 55   Temp 98.4  F (36.9  C) (Oral)   Resp 14   Ht 1.778 m (5' 10\")   Wt 97.8 kg (215 lb 11.2 oz)   SpO2 93%   BMI 30.95 kg/m    Gen: Lying in bed, alert.  Resp: Regular, on room air  Incision: Removed gauze dressing, steri-strips in place, no bleeding. The ON-Q catheters were slightly pulled out of the tegaderm dressing and the sensors were not taped on the skin. Re-taped the catheters and re-taped the sensors to his anterior abdomen.  CT: No air leak, serosanguinous drainage.    CXR: Lungs clear, no ptx, CT in good position    Plan:  - CT to water seal  - Clamp CT tonight at midnight  - Daily pCXR in AM  - Continue ON-Q  - Resp cares/ IS and flutter valve  - Ambulate x 8 and out of bed for meals  - Final pathology pending    Joyce Richards PA-C with Dr. Rafael [facility]  Office: [phone]  Cell: [phone]    "

## 2023-10-18 NOTE — DISCHARGE INSTRUCTIONS
"[facility]   Discharge instructions and Follow-Up Care for Dr. James' Thoracotomy Patients:    You already have a post-op chest x-ray and post-op appointment scheduled as follows:  Go to [facility] ([address], [place] [address]) on Monday, October 30th for a post-op chest x-ray check in at 1:45 AM.    Then go to the [facility] office ([address], [address], [place] [address] [address]) at 2:40 PM the same day for your post-op appointment. You will see either Marce Rocha PA-C or Dr. James for your post-op appointment.     If you need to call to schedule your post-op chest x-ray and appointment: Karla [phone]    Patient care:  Call Dr. James' office [phone] if you experience:   *Severe chills or fever of 101 F or des on two occasions   *Severely increased incisional pain that cannot be relieved with rest or pain medications   *Presence of unusual incisional or chest tube site drainage that is odorous, green or yellow in color, james bright red blood or if your incision is warm/red/swollen   *Coughing up bright red blood or greenish-yellow secretions   *Inability to urinate or have a bowel movement   *New pain or swelling in your legs    In an emergency, call [phone] or have someone drive you to an Emergency Department    Pain Relief:  You may take ibuprofen and acetaminophen according to the directions on the medication bottle. (Common commercial names for ibuprofen at Advil or Motrin). The common commercial name for acetaminophen is Tylenol.) Typically, patients can take Tylenol up to 1000 mg every 6 hours and ibuprofen 400-600 mg every 6-8 hours. You may also have been prescribed a narcotic pain medicine. Most people get good pain relief by \"staggering\" these medications. Many patients keep a log of when they take the medications including the date, time, medication name and dose. Lastly, it is important to take " these medications with food.    No driving while on narcotic pain medicines.    Activity:  No lifting greater than 10 pounds with your operative side arm for the next 4 weeks    Wound Care:   *Please look at your incisions daily and keep them clean while they heal   *Do not apply creams, salves such as Bacitracin, or ointments on the incisions while they heal   *Steri strips (thin white pieces of tape) will be present on the incision(s) and will peel off as your incision heals-- otherwise, they will be removed at your post-op appointment   *Remove the dressing covering your chest tube site 48 hours after your discharge from the hospital. You may then shower. Wash the incision and chest tube sites daily with soap and water. No bathing/immersing incisions under water for two weeks or until the chest tube sites are completely healed.   *After your shower, pat the chest tube sites dry and place a dry gauze dressing (and tape) over the sites. It is normal to have some drainage from the sites for a few days. Do not be alarmed if a large amount of fluid drains (should be pink or yellow) either spontaneously or with coughing or exertion. Should this happen, just place a larger, thicker gauze dressing over the chest tube sites. In about a week, drainage should stop and a scab will form. Once drainage stops, you can stop covering the sites. Call our office if the drainage is milky or green in color or foul-smelling.    Directions for On-Q Pain Pump Removal:  1. Remove the dressing covering the catheter site.  2. Grasp the catheter close to the skin and gently pull on the catheter. It should be easy to remove and not painful. If it becomes hard to remove or stretches, then stop and call   office.  3. Do not cut or pull hard to remove the catheter.  4. After you remove the catheter, check the catheter tip for a black marking to ensure the entire catheter was removed. Call our office if you don't see the black  marking.  5. Place a band-aid over the catheter site if needed.  6. Call our office is you have redness, warmth or excessive bleeding from the catheter site or if there is a large bruise or swollen area around the site.    Respiratory Care:  Utilize the incentive spirometer and flutter valve/acapella (if you received one) several times in a row every few hours while awake for a few weeks after discharging home from the hospital.    Activity:  It may take a few weeks to regain your normal energy level/stamina. It is important during your recovery to get regular physical activity such as walking each day, climbing stairs as tolerated, and avoiding prolonged daytime naps    Return to Work:  Time away from work will depend on your specific situation. In general, you will need between 1-6 weeks to fully recover from surgery. Specific dates for retuning to work can be discussed at your post-op appointment.

## 2023-10-18 NOTE — PLAN OF CARE
Goal Outcome Evaluation:    Orientations: AOx4  Vitals/Pain: VSS, RA, PRN oral dilaudid for pain   Lines/Drains: PIV SL, CT to water seal, OnQ infusing   Skin/Wounds: Thoracotomy incision w/ dried drainage  GI/: UOA, no BM  Labs: Abnormal/Trends, Electrolyte Replacement- N/A  Ambulation/Assist: SBA  Plan: Clamp CT at midnight. CXR in morning. Possible discharge home tomorrow.

## 2023-10-18 NOTE — PLAN OF CARE
A+Ox4 VSS on room air. Lung sounds diminished. Tele sinus bradycardia. CT to suction, dressing CDI, no airleak. Onq infusing. Thoracotomy incision dressing with marked drainage. Dilaudid and toradol given for pain. Bowels active, flatus+, no BM. Voiding adequately. Tolerating diet. Up SBA.

## 2023-10-19 ENCOUNTER — APPOINTMENT (OUTPATIENT)
Dept: GENERAL RADIOLOGY | Facility: CLINIC | Age: 73
DRG: 164 | End: 2023-10-19
Attending: PHYSICIAN ASSISTANT
Payer: COMMERCIAL

## 2023-10-19 VITALS
BODY MASS INDEX: 31.34 KG/M2 | SYSTOLIC BLOOD PRESSURE: 144 MMHG | WEIGHT: 218.9 LBS | HEART RATE: 69 BPM | TEMPERATURE: 97.5 F | DIASTOLIC BLOOD PRESSURE: 87 MMHG | OXYGEN SATURATION: 94 % | HEIGHT: 70 IN | RESPIRATION RATE: 20 BRPM

## 2023-10-19 LAB
GLUCOSE BLDC GLUCOMTR-MCNC: 97 MG/DL (ref 70–99)
PATH REPORT.COMMENTS IMP SPEC: ABNORMAL
PATH REPORT.COMMENTS IMP SPEC: ABNORMAL
PATH REPORT.COMMENTS IMP SPEC: YES
PATH REPORT.FINAL DX SPEC: ABNORMAL
PATH REPORT.GROSS SPEC: ABNORMAL
PATH REPORT.INTRAOP OBS SPEC DOC: ABNORMAL
PATH REPORT.MICROSCOPIC SPEC OTHER STN: ABNORMAL
PATH REPORT.MICROSCOPIC SPEC OTHER STN: ABNORMAL
PATH REPORT.RELEVANT HX SPEC: ABNORMAL
PATHOLOGY SYNOPTIC REPORT: ABNORMAL
PHOTO IMAGE: ABNORMAL

## 2023-10-19 PROCEDURE — 71045 X-RAY EXAM CHEST 1 VIEW: CPT

## 2023-10-19 PROCEDURE — 250N000013 HC RX MED GY IP 250 OP 250 PS 637: Performed by: PHYSICIAN ASSISTANT

## 2023-10-19 PROCEDURE — 250N000011 HC RX IP 250 OP 636: Mod: JZ | Performed by: PHYSICIAN ASSISTANT

## 2023-10-19 PROCEDURE — 250N000009 HC RX 250: Performed by: PHYSICIAN ASSISTANT

## 2023-10-19 RX ORDER — HYDROMORPHONE HYDROCHLORIDE 2 MG/1
2 TABLET ORAL EVERY 4 HOURS PRN
Qty: 30 TABLET | Refills: 0 | Status: SHIPPED | OUTPATIENT
Start: 2023-10-19

## 2023-10-19 RX ADMIN — SENNOSIDES AND DOCUSATE SODIUM 1 TABLET: 50; 8.6 TABLET ORAL at 07:33

## 2023-10-19 RX ADMIN — ACETAMINOPHEN 975 MG: 325 TABLET, FILM COATED ORAL at 06:25

## 2023-10-19 RX ADMIN — CETIRIZINE HYDROCHLORIDE 10 MG: 10 TABLET, FILM COATED ORAL at 07:33

## 2023-10-19 RX ADMIN — ENOXAPARIN SODIUM 40 MG: 40 INJECTION SUBCUTANEOUS at 06:25

## 2023-10-19 RX ADMIN — BACITRACIN: 500 OINTMENT TOPICAL at 06:30

## 2023-10-19 RX ADMIN — AMLODIPINE BESYLATE 2.5 MG: 2.5 TABLET ORAL at 07:32

## 2023-10-19 RX ADMIN — HYDROMORPHONE HYDROCHLORIDE 0.2 MG: 0.2 INJECTION, SOLUTION INTRAMUSCULAR; INTRAVENOUS; SUBCUTANEOUS at 03:54

## 2023-10-19 RX ADMIN — BUPROPION HYDROCHLORIDE 100 MG: 100 TABLET, FILM COATED, EXTENDED RELEASE ORAL at 07:33

## 2023-10-19 RX ADMIN — PANTOPRAZOLE SODIUM 40 MG: 40 TABLET, DELAYED RELEASE ORAL at 07:33

## 2023-10-19 ASSESSMENT — ACTIVITIES OF DAILY LIVING (ADL)
ADLS_ACUITY_SCORE: 22
ADLS_ACUITY_SCORE: 23
ADLS_ACUITY_SCORE: 22

## 2023-10-19 NOTE — PLAN OF CARE
Goal Outcome Evaluation:    Orientations: AOx4  Vitals/Pain: VSS, RA, PRN Toradol and iv dilaudid x1 given for pain   Lines/Drains: PIV SL, OnQ infusing   Skin/Wounds: Thoracotomy incision w/ dried drainage  GI/: UOA, no BM  Labs: Abnormal/Trends, Electrolyte Replacement- N/A  Ambulation/Assist: SBA  Plan: CT came out this morning. discharge home with OnQ pump today.

## 2023-10-19 NOTE — PROGRESS NOTES
"THORACIC SURGERY POD#2    S: Doing well, pain managed with dilaudid. Says the Tylenol is upsetting his stomach and he usually doesn't take Tylenol due to diverticulitis. Otherwise feels ready to go home.    O: BP (!) 142/90 (BP Location: Right arm)   Pulse 55   Temp 98.4  F (36.9  C) (Oral)   Resp 20   Ht 1.778 m (5' 10\")   Wt 97.8 kg (215 lb 11.2 oz)   SpO2 94%   BMI 30.95 kg/m    Gen: Sitting up in bed, alert  Resp: On room air  Incision: Steri-strips in place, no bleeding. Re-taped the ON-Q catheters again.  CT: No air leak with cough. CT removed without complication.  Occlusive dressing applied.    Plan:  - CT removed, occlusive dressing applied  - discussed continuing home arthritis tylenol and dilaudid for pain at home  - Continue ON-Q, daughter will remove when empty at home  - Follow up in clinic 10/30 with CXR  - Will call with final pathology results  - Home today    Joyce Richards PA-C with Dr. Rafael James  [facility]  Office: [phone]  Cell: [phone]    "

## 2023-10-19 NOTE — DISCHARGE SUMMARY
[facility]    Discharge Summary  [facility]  [address], [address]  [place] [address]  [phone]  [email or web address]    Date of Admission:  10/17/2023  Date of Discharge:  10/19/2023  Discharging Provider: Dorothea Richards PA-C      Discharge Diagnoses  Left upper lobe NSCLC    Procedure/Surgery Information   Procedure: Procedure(s):  LEFT THORACOTOMY LEFT UPPER LOBECTOMY, WEDGE RESECTION, LEFT UPPER LOBE LUNG NODULE, MEDIASTINAL LYMPH NODE RESECTION   Surgeon(s): Surgeon(s) and Role:     * Rafael James MD - Primary     * Dorothea Richards PA-C - Assisting   Specimens: ID Type Source Tests Collected by Time Destination   1 : LEFT UPPER LOBE SECTION NODULE Tissue Lung, Left SURGICAL PATHOLOGY EXAM Rafael James MD 10/17/2023  8:45 AM    2 : SUBCARINAL LYMPH NODE #7 Tissue Lymph Node(s) SURGICAL PATHOLOGY EXAM Rafael James MD 10/17/2023  8:47 AM           Hospital Course   As you are aware, we had the pleasure of caring for your patient, Soto Guevara here at [facility].  He is 72-year-old gentleman with a small groundglass nodule towards the apex of the left upper lobe lung medially.  PET scan did not show evidence of aan or distant disease.  He underwent ion robotic bronchoscopy with biopsy.  The pathology showed at least atypical adenomatous hyperplasia.  Options of further diagnostic procedure and treatment reviewed.  It was elected to proceed with surgical resection.     On 10/17/2023, Dr. Rafael James performed:  Limited left thoracotomy with wedge resection of left upper lobe lung nodule and mediastinal lymph node dissection     Final Surgical Pathology Revealed:  Minimally invasive, grade 1 adenocarcinoma of the left upper lobe lung with 2 mm invasive component (total tumor size 1 cm). Negative surgical margins and no visceral pleural involvement. Final pathological stage T1mi N0 M0 and  "Final Stage IA1.    His post-operative course was unremarkable.    Soto Guevara has otherwise recovered sufficiently to be discharged to home today, Thursday, October 19th, on POD#2 for further convalescence.  His incisions are healing well with no signs or symptoms of infection.  His bowels have moved sufficiently and he is tolerating diet and activity, ambulating and transferring independently.  He is currently afebrile with stable vital signs as below. BP (!) 142/90 (BP Location: Right arm)   Pulse 55   Temp 98.4  F (36.9  C) (Oral)   Resp 20   Ht 1.778 m (5' 10\")   Wt 99.3 kg (218 lb 14.4 oz)   SpO2 94%   BMI 31.41 kg/m   on room air.  Post-operative pain control: included PO Tylenol and PO Dilaudid 2 mg q 4 hrs prn   Medications discontinued or adjusted during this hospitalization : No change   Antibiotics prescribed at discharge: None prescribed   Imaging study follow up needs:   -CXR at post op as scheduled  Significant Findings: N/A    Below, you will find a full discharge medication list and instructions.  Mr. Guevara has been instructed to to follow-up with us in our [facility] in 1-2 weeks with a Chest X-ray prior to that appointment.  We thank you for allowing us to participate in the care of Mr. Guevara here at [facility].  Please feel free to contact our office at [phone] with any questions or concerns or if we can be of any further assistance in the care of this patient.    Sincerely,    Dr. Rafael James MD    D/C Summary Prepared by: Joyce Richards PA-C    Discharge Disposition   Discharged to home   Condition at discharge: Stable    Primary Care Physician   Carlos Merida    Consultations This Hospital Stay   None    Time Spent on this Encounter   I have spent less than 30 minutes on this discharge.    Discharge Orders      Reason for your hospital stay    You were in the hospital for lung surgery with Dr. James.     Follow-up and recommended labs " and tests     You will need a post operative follow up chest x-ray and appointment with Dr. James 1-2 weeks after surgery.     Activity    Your activity upon discharge: activity as tolerated     Diet    Follow this diet upon discharge: Regular diet       Discharge Instructions:  1) Remove chest tube dressing in two days and then it is Ok to shower.  Please wash both incision and chest tube site daily with soap and water. You may cover the chest tube site daily with dry gauze and tape or a bandaid if it continues to drain. Once it stops draining please leave it open to the air to finish healing..  2) Steri-strips can be removed in 1 week or they will fall off when they are ready.  3) Continue daily use of your Incentive Spirometer, set of 10x in a row, every 1-2 hours while you are awake during the day.  4) No lifting, pushing or pulling 8-10 lbs for 4-6 weeks from the day of your surgery (if thoracotomy).    5. No driving while on narcotic pain medications.    Follow-Up Care:1) Follow up with Dr. James, Marce Rocha PA-C or Joyce Richards PA-C (Thoracic Surgery) in 7-10 days at the [facility] [place] office.  You will need to go to [facility] for a chest X-ray prior to that appointment. These appointments have already been arranged for you and are detailed in your discharge summary paperwork. Please call nancy [employer], at [phone] with any scheduling questions.    2) Follow up with Primary Care Provider, Carlos Merida within 1 month of discharge for routine post-surgical care.  Please call [phone] to arrange this appointment.       Discharge Medications   Current Discharge Medication List        START taking these medications    Details   HYDROmorphone (DILAUDID) 2 MG tablet Take 1 tablet (2 mg) by mouth every 4 hours as needed for moderate to severe pain  Qty: 30 tablet, Refills: 0    Associated Diagnoses: Acute post-operative pain           CONTINUE these  medications which have NOT CHANGED    Details   acetaminophen (TYLENOL 8 HOUR ARTHRITIS PAIN) 650 MG CR tablet Take 0.5 tablets by mouth every 8 hours as needed for mild pain or fever (0.5 x 650 mg = 325 mg)      amLODIPine (NORVASC) 2.5 MG tablet Take 1 tablet by mouth daily      buPROPion (WELLBUTRIN SR) 100 MG 12 hr tablet Take 1 tablet by mouth every morning      cetirizine (ZYRTEC) 10 MG tablet Take 1 tablet by mouth daily      lactobacillus rhamnosus, GG, (CULTURELL) capsule Take 1 capsule by mouth daily      MULTIPLE VITAMIN-FOLIC ACID PO Take 1 capsule by mouth daily      naphazoline-pheniramine (OPCON-A) SOLN ophthalmic solution Place 1 drop into both eyes 2 times daily      olopatadine (PATADAY) 0.2 % ophthalmic solution Place 1 drop into both eyes daily      Omega-3 Fatty Acids (FISH OIL) 1200 MG capsule Take 1 capsule by mouth daily      omeprazole (PRILOSEC) 20 MG DR capsule Take 1 capsule by mouth daily                   Joyce Richards PA-C with Dr. Rafael James  [facility]  Office: [phone]  Cell: [phone]

## 2023-10-30 ENCOUNTER — HOSPITAL ENCOUNTER (OUTPATIENT)
Dept: GENERAL RADIOLOGY | Facility: CLINIC | Age: 73
Discharge: HOME | End: 2023-10-30
Attending: THORACIC SURGERY (CARDIOTHORACIC VASCULAR SURGERY) | Admitting: THORACIC SURGERY (CARDIOTHORACIC VASCULAR SURGERY)
Payer: COMMERCIAL

## 2023-10-30 PROCEDURE — 71046 X-RAY EXAM CHEST 2 VIEWS: CPT

## 2024-04-04 ENCOUNTER — ANCILLARY PROCEDURE (OUTPATIENT)
Dept: CT IMAGING | Facility: CLINIC | Age: 74
End: 2024-04-04
Attending: THORACIC SURGERY (CARDIOTHORACIC VASCULAR SURGERY)
Payer: COMMERCIAL

## 2024-04-04 DIAGNOSIS — C34.12 MALIGNANT NEOPLASM OF BRONCHUS OF LEFT UPPER LOBE (H): ICD-10-CM

## 2024-04-04 PROCEDURE — 71260 CT THORAX DX C+: CPT

## 2024-04-04 PROCEDURE — 250N000011 HC RX IP 250 OP 636: Performed by: THORACIC SURGERY (CARDIOTHORACIC VASCULAR SURGERY)

## 2024-04-04 PROCEDURE — 250N000009 HC RX 250: Performed by: THORACIC SURGERY (CARDIOTHORACIC VASCULAR SURGERY)

## 2024-04-04 RX ORDER — IOPAMIDOL 755 MG/ML
72 INJECTION, SOLUTION INTRAVASCULAR ONCE
Status: COMPLETED | OUTPATIENT
Start: 2024-04-04 | End: 2024-04-04

## 2024-04-04 RX ADMIN — SODIUM CHLORIDE 40 ML: 9 INJECTION, SOLUTION INTRAVENOUS at 10:57

## 2024-04-04 RX ADMIN — IOPAMIDOL 72 ML: 755 INJECTION, SOLUTION INTRAVENOUS at 10:57

## 2024-10-16 ENCOUNTER — ANCILLARY PROCEDURE (OUTPATIENT)
Dept: CT IMAGING | Facility: CLINIC | Age: 74
End: 2024-10-16
Attending: THORACIC SURGERY (CARDIOTHORACIC VASCULAR SURGERY)
Payer: COMMERCIAL

## 2024-10-16 DIAGNOSIS — Z85.118: ICD-10-CM

## 2024-10-16 LAB
CREAT BLD-MCNC: 0.9 MG/DL (ref 0.7–1.3)
EGFRCR SERPLBLD CKD-EPI 2021: >60 ML/MIN/1.73M2

## 2024-10-16 PROCEDURE — 250N000011 HC RX IP 250 OP 636: Performed by: THORACIC SURGERY (CARDIOTHORACIC VASCULAR SURGERY)

## 2024-10-16 PROCEDURE — 250N000009 HC RX 250: Performed by: THORACIC SURGERY (CARDIOTHORACIC VASCULAR SURGERY)

## 2024-10-16 PROCEDURE — 71260 CT THORAX DX C+: CPT

## 2024-10-16 PROCEDURE — 82565 ASSAY OF CREATININE: CPT

## 2024-10-16 RX ORDER — IOPAMIDOL 755 MG/ML
71 INJECTION, SOLUTION INTRAVASCULAR ONCE
Status: COMPLETED | OUTPATIENT
Start: 2024-10-16 | End: 2024-10-16

## 2024-10-16 RX ADMIN — IOPAMIDOL 71 ML: 755 INJECTION, SOLUTION INTRAVENOUS at 14:02

## 2024-10-16 RX ADMIN — SODIUM CHLORIDE 40 ML: 9 INJECTION, SOLUTION INTRAVENOUS at 14:03

## 2025-04-16 ENCOUNTER — ANCILLARY PROCEDURE (OUTPATIENT)
Dept: CT IMAGING | Facility: CLINIC | Age: 75
End: 2025-04-16
Attending: THORACIC SURGERY (CARDIOTHORACIC VASCULAR SURGERY)
Payer: COMMERCIAL

## 2025-04-16 DIAGNOSIS — C34.90 NON-SMALL CELL LUNG CANCER (H): ICD-10-CM

## 2025-04-16 DIAGNOSIS — Z85.118 PERSONAL HISTORY OF OTHER MALIGNANT NEOPLASM OF BRONCHUS AND LUNG: ICD-10-CM

## 2025-04-16 PROCEDURE — 71250 CT THORAX DX C-: CPT

## (undated) DEVICE — PACK MINOR SBA15MIFSE

## (undated) DEVICE — ADPT 5 IN 1 360

## (undated) DEVICE — NDL 22GA 1.5"

## (undated) DEVICE — BLADE CLIPPER 4406

## (undated) DEVICE — ESU PENCIL W/HOLSTER E2350H

## (undated) DEVICE — SU PROLENE 4-0 V-7DA 36" 8975H

## (undated) DEVICE — TAPE DRSG UNIVERSAL CLOTH 3" WHITE LATEX 881-3

## (undated) DEVICE — SOL WATER IRRIG 1000ML BOTTLE 2F7114

## (undated) DEVICE — DRSG GAUZE 4X4" 3033

## (undated) DEVICE — TUBING SUCTION MEDI-VAC SOFT 3/16"X20' N520A

## (undated) DEVICE — DRAPE IOBAN INCISE 23X17" 6650EZ

## (undated) DEVICE — BLADE KNIFE SURG 15 371115

## (undated) DEVICE — BLADE KNIFE SURG 10 371110

## (undated) DEVICE — DRSG KERLIX 4 1/2"X4YDS ROLL 6715

## (undated) DEVICE — SU VICRYL 1 CT 36" J959H

## (undated) DEVICE — DRAIN CHEST TUBE 28FR STR 8028

## (undated) DEVICE — SU VICRYL 1 CTX CR 8X18" J765D

## (undated) DEVICE — LINEN GOWN OVERSIZE 5408

## (undated) DEVICE — KIT SURGICAL TURNOVER FVSD-01D

## (undated) DEVICE — SYR BULB IRRIG 50ML LATEX FREE 0035280

## (undated) DEVICE — SU PROLENE 3-0 V-7DA 36" 8976H

## (undated) DEVICE — STPL ENDO ARTICULATING 60MM EC60A

## (undated) DEVICE — DRAIN PENROSE 0.75"X18" LATEX FREE GR205

## (undated) DEVICE — LINEN TOWEL PACK X5 5464

## (undated) DEVICE — ESU ELEC BLADE 6" COATED/INSULATED E1455-6

## (undated) DEVICE — STPL RELOAD REG/THK TISSUE ECHELON 60 X 3.8MM GOLD GST60D

## (undated) DEVICE — SOL NACL 0.9% IRRIG 1000ML BOTTLE 2F7124

## (undated) DEVICE — SU VICRYL 2-0 CT-1 27" J339H

## (undated) DEVICE — ESU GROUND PAD UNIVERSAL W/O CORD

## (undated) DEVICE — DRSG STERI STRIP 1/2X4" R1547

## (undated) DEVICE — SU NDL CUT REV MED 3/8 209014

## (undated) DEVICE — SUCTION CANISTER MEDIVAC LINER 3000ML W/LID 65651-530

## (undated) DEVICE — CATH ON-Q PAIN SILVER SKR 2.5" PM010-A

## (undated) DEVICE — GLOVE BIOGEL PI ULTRATOUCH G SZ 7.5 42175

## (undated) DEVICE — SPONGE LAP 18X18" X8435

## (undated) DEVICE — GLOVE BIOGEL PI ULTRATOUCH G SZ 6.5 42165

## (undated) DEVICE — GOWN IMPERVIOUS ZONED LG

## (undated) DEVICE — PREP CHLORAPREP 26ML TINTED HI-LITE ORANGE 930815

## (undated) DEVICE — SUCTION DRY CHEST DRAIN OASIS 3600-100

## (undated) DEVICE — SURGICEL HEMOSTAT 3X4" NUKNIT 1943

## (undated) DEVICE — STPL SKIN SUBCUTICULAR INSORB  2030

## (undated) DEVICE — DRAPE BREAST/CHEST 29420

## (undated) DEVICE — CLIP APPLIER 11" MED LIGACLIP MCM20

## (undated) DEVICE — DRAPE POUCH INSTRUMENT 1018

## (undated) DEVICE — DECANTER BAG 2002S

## (undated) DEVICE — TUBING SUCTION 12"X1/4" N612

## (undated) DEVICE — SU SILK 2 REEL 60" SA8H

## (undated) RX ORDER — ONDANSETRON 2 MG/ML
INJECTION INTRAMUSCULAR; INTRAVENOUS
Status: DISPENSED
Start: 2023-10-17

## (undated) RX ORDER — PROPOFOL 10 MG/ML
INJECTION, EMULSION INTRAVENOUS
Status: DISPENSED
Start: 2023-10-17

## (undated) RX ORDER — FENTANYL CITRATE 0.05 MG/ML
INJECTION, SOLUTION INTRAMUSCULAR; INTRAVENOUS
Status: DISPENSED
Start: 2023-10-17

## (undated) RX ORDER — FENTANYL CITRATE 50 UG/ML
INJECTION, SOLUTION INTRAMUSCULAR; INTRAVENOUS
Status: DISPENSED
Start: 2019-09-10

## (undated) RX ORDER — HYDROMORPHONE HCL IN WATER/PF 6 MG/30 ML
PATIENT CONTROLLED ANALGESIA SYRINGE INTRAVENOUS
Status: DISPENSED
Start: 2023-10-17

## (undated) RX ORDER — BUPIVACAINE HYDROCHLORIDE 5 MG/ML
INJECTION, SOLUTION EPIDURAL; INTRACAUDAL
Status: DISPENSED
Start: 2023-10-17

## (undated) RX ORDER — CEFAZOLIN SODIUM/WATER 2 G/20 ML
SYRINGE (ML) INTRAVENOUS
Status: DISPENSED
Start: 2023-10-17

## (undated) RX ORDER — FENTANYL CITRATE 50 UG/ML
INJECTION, SOLUTION INTRAMUSCULAR; INTRAVENOUS
Status: DISPENSED
Start: 2023-10-17

## (undated) RX ORDER — HYDROMORPHONE HYDROCHLORIDE 1 MG/ML
INJECTION, SOLUTION INTRAMUSCULAR; INTRAVENOUS; SUBCUTANEOUS
Status: DISPENSED
Start: 2023-10-17

## (undated) RX ORDER — DEXAMETHASONE SODIUM PHOSPHATE 4 MG/ML
INJECTION, SOLUTION INTRA-ARTICULAR; INTRALESIONAL; INTRAMUSCULAR; INTRAVENOUS; SOFT TISSUE
Status: DISPENSED
Start: 2023-10-17

## (undated) RX ORDER — KETOROLAC TROMETHAMINE 15 MG/ML
INJECTION, SOLUTION INTRAMUSCULAR; INTRAVENOUS
Status: DISPENSED
Start: 2023-10-17